# Patient Record
Sex: FEMALE | Race: WHITE | NOT HISPANIC OR LATINO | Employment: FULL TIME | URBAN - METROPOLITAN AREA
[De-identification: names, ages, dates, MRNs, and addresses within clinical notes are randomized per-mention and may not be internally consistent; named-entity substitution may affect disease eponyms.]

---
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
## 2022-05-07 ENCOUNTER — HEALTH MAINTENANCE LETTER (OUTPATIENT)
Age: 22
End: 2022-05-07

## 2023-01-07 ENCOUNTER — HEALTH MAINTENANCE LETTER (OUTPATIENT)
Age: 23
End: 2023-01-07

## 2023-06-02 ENCOUNTER — HEALTH MAINTENANCE LETTER (OUTPATIENT)
Age: 23
End: 2023-06-02

## 2024-09-02 ENCOUNTER — HOSPITAL ENCOUNTER (EMERGENCY)
Facility: CLINIC | Age: 24
Discharge: LEFT BEFORE DISCHARGE | End: 2024-09-02
Admitting: EMERGENCY MEDICINE
Payer: COMMERCIAL

## 2024-09-02 ENCOUNTER — HOSPITAL ENCOUNTER (EMERGENCY)
Facility: CLINIC | Age: 24
Discharge: HOME | End: 2024-09-03
Attending: EMERGENCY MEDICINE | Admitting: EMERGENCY MEDICINE
Payer: COMMERCIAL

## 2024-09-02 VITALS
DIASTOLIC BLOOD PRESSURE: 72 MMHG | RESPIRATION RATE: 14 BRPM | HEIGHT: 68 IN | SYSTOLIC BLOOD PRESSURE: 127 MMHG | HEART RATE: 65 BPM | OXYGEN SATURATION: 98 % | TEMPERATURE: 97.8 F

## 2024-09-02 DIAGNOSIS — R45.851 SUICIDAL IDEATION: ICD-10-CM

## 2024-09-02 DIAGNOSIS — R60.0 LOWER EXTREMITY EDEMA: ICD-10-CM

## 2024-09-02 DIAGNOSIS — F50.9 EATING DISORDER, UNSPECIFIED TYPE: ICD-10-CM

## 2024-09-02 PROCEDURE — 83735 ASSAY OF MAGNESIUM: CPT | Performed by: EMERGENCY MEDICINE

## 2024-09-02 PROCEDURE — 84443 ASSAY THYROID STIM HORMONE: CPT | Performed by: EMERGENCY MEDICINE

## 2024-09-02 PROCEDURE — 81001 URINALYSIS AUTO W/SCOPE: CPT | Performed by: EMERGENCY MEDICINE

## 2024-09-02 PROCEDURE — 99285 EMERGENCY DEPT VISIT HI MDM: CPT | Performed by: EMERGENCY MEDICINE

## 2024-09-02 PROCEDURE — 84703 CHORIONIC GONADOTROPIN ASSAY: CPT | Performed by: EMERGENCY MEDICINE

## 2024-09-02 PROCEDURE — 99281 EMR DPT VST MAYX REQ PHY/QHP: CPT

## 2024-09-02 PROCEDURE — 85004 AUTOMATED DIFF WBC COUNT: CPT | Performed by: EMERGENCY MEDICINE

## 2024-09-02 PROCEDURE — 36415 COLL VENOUS BLD VENIPUNCTURE: CPT | Performed by: EMERGENCY MEDICINE

## 2024-09-02 PROCEDURE — 82040 ASSAY OF SERUM ALBUMIN: CPT | Performed by: EMERGENCY MEDICINE

## 2024-09-02 PROCEDURE — 83880 ASSAY OF NATRIURETIC PEPTIDE: CPT | Performed by: EMERGENCY MEDICINE

## 2024-09-02 PROCEDURE — 82077 ASSAY SPEC XCP UR&BREATH IA: CPT | Performed by: EMERGENCY MEDICINE

## 2024-09-02 PROCEDURE — 80307 DRUG TEST PRSMV CHEM ANLYZR: CPT | Performed by: EMERGENCY MEDICINE

## 2024-09-02 PROCEDURE — 83690 ASSAY OF LIPASE: CPT | Performed by: EMERGENCY MEDICINE

## 2024-09-02 RX ORDER — PROPRANOLOL HYDROCHLORIDE 10 MG/1
10 TABLET ORAL 3 TIMES DAILY PRN
COMMUNITY

## 2024-09-02 RX ORDER — HYDROXYZINE HYDROCHLORIDE 50 MG/1
50 TABLET, FILM COATED ORAL 3 TIMES DAILY PRN
COMMUNITY

## 2024-09-02 ASSESSMENT — COLUMBIA-SUICIDE SEVERITY RATING SCALE - C-SSRS
1. IN THE PAST MONTH, HAVE YOU WISHED YOU WERE DEAD OR WISHED YOU COULD GO TO SLEEP AND NOT WAKE UP?: YES
2. HAVE YOU ACTUALLY HAD ANY THOUGHTS OF KILLING YOURSELF IN THE PAST MONTH?: YES
6. HAVE YOU EVER DONE ANYTHING, STARTED TO DO ANYTHING, OR PREPARED TO DO ANYTHING TO END YOUR LIFE?: NO
1. IN THE PAST MONTH, HAVE YOU WISHED YOU WERE DEAD OR WISHED YOU COULD GO TO SLEEP AND NOT WAKE UP?: NO
3. HAVE YOU BEEN THINKING ABOUT HOW YOU MIGHT KILL YOURSELF?: NO
4. HAVE YOU HAD THESE THOUGHTS AND HAD SOME INTENTION OF ACTING ON THEM?: NO
5. HAVE YOU STARTED TO WORK OUT OR WORKED OUT THE DETAILS OF HOW TO KILL YOURSELF? DO YOU INTEND TO CARRY OUT THIS PLAN?: NO
2. HAVE YOU ACTUALLY HAD ANY THOUGHTS OF KILLING YOURSELF IN THE PAST MONTH?: NO
6. HAVE YOU EVER DONE ANYTHING, STARTED TO DO ANYTHING, OR PREPARED TO DO ANYTHING TO END YOUR LIFE?: NO

## 2024-09-02 ASSESSMENT — ACTIVITIES OF DAILY LIVING (ADL): ADLS_ACUITY_SCORE: 33

## 2024-09-02 NOTE — LETTER
September 3, 2024      To Whom It May Concern:      Dulce Menjivar was in our Emergency Department today, 09/03/24.  I recommend she be excused from work on 9/4/24. She may return to work/school when improved.      Sincerely,        Dakota Keene, DO

## 2024-09-02 NOTE — LETTER
September 3, 2024      To Whom It May Concern:      Sparkle Menjivar was seen in our Emergency Department today, 09/03/24.  I expect her condition to improve over the next 2-3 days.  She may return to work/school when improved.    Sincerely,        Dakota Keene, DO

## 2024-09-03 ENCOUNTER — TELEPHONE (OUTPATIENT)
Dept: BEHAVIORAL HEALTH | Facility: CLINIC | Age: 24
End: 2024-09-03
Payer: COMMERCIAL

## 2024-09-03 VITALS
BODY MASS INDEX: 21.82 KG/M2 | WEIGHT: 144 LBS | RESPIRATION RATE: 16 BRPM | SYSTOLIC BLOOD PRESSURE: 104 MMHG | HEIGHT: 68 IN | OXYGEN SATURATION: 100 % | TEMPERATURE: 97.7 F | DIASTOLIC BLOOD PRESSURE: 70 MMHG | HEART RATE: 56 BPM

## 2024-09-03 PROBLEM — R45.851 SUICIDAL IDEATION: Status: ACTIVE | Noted: 2024-09-03

## 2024-09-03 PROBLEM — F29 PSYCHOSIS (H): Status: ACTIVE | Noted: 2024-09-03

## 2024-09-03 LAB
ALBUMIN SERPL BCG-MCNC: 4.4 G/DL (ref 3.5–5.2)
ALBUMIN UR-MCNC: NEGATIVE MG/DL
ALP SERPL-CCNC: 47 U/L (ref 40–150)
ALT SERPL W P-5'-P-CCNC: 7 U/L (ref 0–50)
AMPHETAMINES UR QL SCN: NORMAL
ANION GAP SERPL CALCULATED.3IONS-SCNC: 11 MMOL/L (ref 7–15)
APPEARANCE UR: CLEAR
AST SERPL W P-5'-P-CCNC: 18 U/L (ref 0–45)
BARBITURATES UR QL SCN: NORMAL
BASOPHILS # BLD AUTO: 0.1 10E3/UL (ref 0–0.2)
BASOPHILS NFR BLD AUTO: 1 %
BENZODIAZ UR QL SCN: NORMAL
BILIRUB SERPL-MCNC: 0.4 MG/DL
BILIRUB UR QL STRIP: NEGATIVE
BUN SERPL-MCNC: 7.2 MG/DL (ref 6–20)
BZE UR QL SCN: NORMAL
CALCIUM SERPL-MCNC: 9 MG/DL (ref 8.8–10.4)
CANNABINOIDS UR QL SCN: NORMAL
CHLORIDE SERPL-SCNC: 104 MMOL/L (ref 98–107)
COLOR UR AUTO: ABNORMAL
CREAT SERPL-MCNC: 0.71 MG/DL (ref 0.51–0.95)
EGFRCR SERPLBLD CKD-EPI 2021: >90 ML/MIN/1.73M2
EOSINOPHIL # BLD AUTO: 0.1 10E3/UL (ref 0–0.7)
EOSINOPHIL NFR BLD AUTO: 2 %
ERYTHROCYTE [DISTWIDTH] IN BLOOD BY AUTOMATED COUNT: 12.3 % (ref 10–15)
ETHANOL SERPL-MCNC: <0.01 G/DL
FENTANYL UR QL: NORMAL
GLUCOSE SERPL-MCNC: 115 MG/DL (ref 70–99)
GLUCOSE UR STRIP-MCNC: NEGATIVE MG/DL
HCG SERPL QL: NEGATIVE
HCO3 SERPL-SCNC: 26 MMOL/L (ref 22–29)
HCT VFR BLD AUTO: 34.8 % (ref 35–47)
HGB BLD-MCNC: 11.9 G/DL (ref 11.7–15.7)
HGB UR QL STRIP: NEGATIVE
IMM GRANULOCYTES # BLD: 0 10E3/UL
IMM GRANULOCYTES NFR BLD: 0 %
KETONES UR STRIP-MCNC: NEGATIVE MG/DL
LEUKOCYTE ESTERASE UR QL STRIP: ABNORMAL
LIPASE SERPL-CCNC: 31 U/L (ref 13–60)
LYMPHOCYTES # BLD AUTO: 2.9 10E3/UL (ref 0.8–5.3)
LYMPHOCYTES NFR BLD AUTO: 38 %
MAGNESIUM SERPL-MCNC: 2.1 MG/DL (ref 1.7–2.3)
MCH RBC QN AUTO: 31.6 PG (ref 26.5–33)
MCHC RBC AUTO-ENTMCNC: 34.2 G/DL (ref 31.5–36.5)
MCV RBC AUTO: 92 FL (ref 78–100)
MONOCYTES # BLD AUTO: 0.3 10E3/UL (ref 0–1.3)
MONOCYTES NFR BLD AUTO: 4 %
NEUTROPHILS # BLD AUTO: 4.3 10E3/UL (ref 1.6–8.3)
NEUTROPHILS NFR BLD AUTO: 55 %
NITRATE UR QL: NEGATIVE
NRBC # BLD AUTO: 0 10E3/UL
NRBC BLD AUTO-RTO: 0 /100
NT-PROBNP SERPL-MCNC: 131 PG/ML (ref 0–450)
OPIATES UR QL SCN: NORMAL
PCP QUAL URINE (ROCHE): NORMAL
PH UR STRIP: 6.5 [PH] (ref 5–7)
PLATELET # BLD AUTO: 172 10E3/UL (ref 150–450)
POTASSIUM SERPL-SCNC: 3.2 MMOL/L (ref 3.4–5.3)
PROT SERPL-MCNC: 7.2 G/DL (ref 6.4–8.3)
RBC # BLD AUTO: 3.77 10E6/UL (ref 3.8–5.2)
RBC URINE: 1 /HPF
SODIUM SERPL-SCNC: 141 MMOL/L (ref 135–145)
SP GR UR STRIP: 1.01 (ref 1–1.03)
TSH SERPL DL<=0.005 MIU/L-ACNC: 2.44 UIU/ML (ref 0.3–4.2)
UROBILINOGEN UR STRIP-MCNC: NORMAL MG/DL
WBC # BLD AUTO: 7.8 10E3/UL (ref 4–11)
WBC URINE: 1 /HPF

## 2024-09-03 PROCEDURE — 250N000013 HC RX MED GY IP 250 OP 250 PS 637: Performed by: EMERGENCY MEDICINE

## 2024-09-03 RX ORDER — POTASSIUM CHLORIDE 20MEQ/15ML
40 LIQUID (ML) ORAL ONCE
Status: COMPLETED | OUTPATIENT
Start: 2024-09-03 | End: 2024-09-03

## 2024-09-03 RX ORDER — HYDROXYZINE HYDROCHLORIDE 25 MG/1
25 TABLET, FILM COATED ORAL EVERY 8 HOURS PRN
Status: DISCONTINUED | OUTPATIENT
Start: 2024-09-03 | End: 2024-09-03 | Stop reason: HOSPADM

## 2024-09-03 RX ADMIN — POTASSIUM CHLORIDE 40 MEQ: 40 SOLUTION ORAL at 00:55

## 2024-09-03 ASSESSMENT — ACTIVITIES OF DAILY LIVING (ADL)
ADLS_ACUITY_SCORE: 35

## 2024-09-03 NOTE — ED PROVIDER NOTES
Patient received in signout from Dr. Bains.  See his note for further documentation.  Patient with history of autism, presents to the ED for evaluation of auditory hallucinations.  Plan at signout is follow-up with mental health assessment recommendations and disposition accordingly.    Patient was seen by the .  See their note for supporting documentation.  Patient does continue to express auditory hallucinations but denies any active suicidal and has no intent to act on the hallucinations.  Does not meet inpatient criteria.  Plan for close outpatient psychiatry follow-up.  Can consider either level of care such as an IRTS facility.  Will follow-up in the transition clinic to be scheduled by the assessment team.    I discussed the plan and recommendations extensively with the patient and her sister.  They are eager to go home and feel comfortable with this plan.  The patient will stay with her sister who will monitor and help manage her symptoms and agrees to return to the ED with any new or worsening symptoms.     Dakota Keene,   09/03/24 0344

## 2024-09-03 NOTE — ED TRIAGE NOTES
Hallucinations Patient presents due to command hallucinations telling patient to kill herself.        Suicidal Patient reports suicidal thoughts; denies plan at this time.         Triage Assessment (Adult)       Row Name 09/02/24 8266          Triage Assessment    Airway WDL WDL        Respiratory WDL    Respiratory WDL WDL        Skin Circulation/Temperature WDL    Skin Circulation/Temperature WDL WDL        Cardiac WDL    Cardiac WDL WDL        Peripheral/Neurovascular WDL    Peripheral Neurovascular WDL WDL        Cognitive/Neuro/Behavioral WDL    Cognitive/Neuro/Behavioral WDL WDL

## 2024-09-03 NOTE — TELEPHONE ENCOUNTER
Reached out to patient. Explained referral and inquired why patient requesting Diagnostic Assessment. Recently more MH going on and needs reassessment to determine if other psychosis at play. Said had assessment done years ago.     Scheduled DA 9/12/2024.    Radha Davis  Transition Clinic Coordinator  09/03/24 9:42 AM

## 2024-09-03 NOTE — DISCHARGE INSTRUCTIONS
Please make an appointment to follow up with your therapist and/or Psychiatric Clinic (phone: [phone])    Return to the ED if you are having worsening thoughts/feelings of harming yourself or others, or any urgent/life-threatening concerns.     DISCHARGE RESOURCES:  -[facility] [phone]   -[facility] [phone] or [phone].  -Crisis Intervention: [phone] or [phone] (TTY: [phone]).  Call anytime.  -Suicide Awareness Voices of Education (SAVE) (www.save.org): 104-083-HRJC (5333)  -National Suicide Prevention Line (www.mentalhealthmn.org): 132-099-XGJC (0589)  -National [place] on Mental Illness (www.[place].zabrina.org): 745.585.7751 or 292-047-8533.  -Tria9wtyc: text the word LIFE to 45383 for immediate support and crisis intervention  -Mental Health Consumer/Survivor Network of [place] (www.mhcsn.net): 680.644.2767 or 423-318-1388  -Mental Health Association [place] (www.mentalhealth.org): 487.374.6116 or 304-169-8211

## 2024-09-03 NOTE — TELEPHONE ENCOUNTER
Teams referral: Frankie Reeder 3:22 AM  Hello, pt [id number] will need help scheduling a diagnostic assessment in the [facility] area. Ph: [phone], email:  [email or web address]  . No other preferences stated. My apologies if I need to ask another dept for a DA...feel free to message me directly if I need to refer elsewhere. Thanks!    Radha Davis  Transition Clinic Coordinator  09/03/24 7:41 AM
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
## 2024-09-11 ASSESSMENT — ANXIETY QUESTIONNAIRES
7. FEELING AFRAID AS IF SOMETHING AWFUL MIGHT HAPPEN: NEARLY EVERY DAY
GAD7 TOTAL SCORE: 21
GAD7 TOTAL SCORE: 21
8. IF YOU CHECKED OFF ANY PROBLEMS, HOW DIFFICULT HAVE THESE MADE IT FOR YOU TO DO YOUR WORK, TAKE CARE OF THINGS AT HOME, OR GET ALONG WITH OTHER PEOPLE?: EXTREMELY DIFFICULT
GAD7 TOTAL SCORE: 21

## 2024-09-11 ASSESSMENT — PATIENT HEALTH QUESTIONNAIRE - PHQ9
SUM OF ALL RESPONSES TO PHQ QUESTIONS 1-9: 27
10. IF YOU CHECKED OFF ANY PROBLEMS, HOW DIFFICULT HAVE THESE PROBLEMS MADE IT FOR YOU TO DO YOUR WORK, TAKE CARE OF THINGS AT HOME, OR GET ALONG WITH OTHER PEOPLE: EXTREMELY DIFFICULT
SUM OF ALL RESPONSES TO PHQ QUESTIONS 1-9: 27

## 2024-09-12 ENCOUNTER — VIRTUAL VISIT (OUTPATIENT)
Dept: BEHAVIORAL HEALTH | Facility: CLINIC | Age: 24
End: 2024-09-12
Payer: COMMERCIAL

## 2024-09-12 ENCOUNTER — TELEPHONE (OUTPATIENT)
Dept: BEHAVIORAL HEALTH | Facility: CLINIC | Age: 24
End: 2024-09-12
Payer: COMMERCIAL

## 2024-09-12 DIAGNOSIS — F41.9 ANXIETY DISORDER, UNSPECIFIED: ICD-10-CM

## 2024-09-12 DIAGNOSIS — F34.1 PERSISTENT DEPRESSIVE DISORDER WITH ANXIOUS DISTRESS, CURRENTLY MODERATE: Primary | ICD-10-CM

## 2024-09-12 DIAGNOSIS — F41.9 ANXIETY DISORDER, UNSPECIFIED TYPE: Primary | ICD-10-CM

## 2024-09-12 PROCEDURE — 99207 PR NO CHARGE LOS: CPT | Mod: 95 | Performed by: SOCIAL WORKER

## 2024-09-12 NOTE — PROGRESS NOTES
Answers submitted by the patient for this visit:  Patient Health Questionnaire (Submitted on 2024)  If you checked off any problems, how difficult have these problems made it for you to do your work, take care of things at home, or get along with other people?: Extremely difficult  PHQ9 TOTAL SCORE: 27  Patient Health Questionnaire (G7) (Submitted on 2024)  EDGAR 7 TOTAL SCORE: 21      Transition [facility] - Initial Visit Progress Note    Patient  Name: Sparkle Menjivar, : 2000    Date:  2024       Service Type:  Mental Health Initial Visit       VISIT TIME START: 830  VISIT TIME END: 922     Session Length:   TC Session Length: 45 (38-52 Minutes)    Visit #: 1    Attendees:  Patient and her sister Dulce     Service Modality:  Service Modality: Video Visit:      Provider verified identity through the following two step process.  Patient provided:  Patient     Telemedicine Visit: The patient's condition can be safely assessed and treated via synchronous audio and visual telemedicine encounter.      Reason for Telemedicine Visit:  Patient was referred from ED for diagnostic of assessment and recommendations.    Originating Site (Patient Location): Patient's home    Distant Site (Provider Location): Provider Remote Setting- Home Office    Consent:  The patient/guardian has verbally consented to: the potential risks and benefits of telemedicine (video visit) versus in person care; bill my insurance or make self-payment for services provided; and responsibility for payment of non-covered services.     Patient would like the video invitation sent by:  Send to e-mail at: [email or web address]    Mode of Communication:  Video Conference via [facility]    Distant Location (Provider):  Off-site    As the provider I attest to compliance with applicable laws and regulations related to telemedicine.    Source of Referral:  Other      Informed Consent and Assessment Methods    This provider and patient  discussed HIPAA, and the limits of confidentiality; including mandated reporting, the possibility of collaborative discussions with patient's primary care provider and the multidisciplinary team in the MH clinic during consultation.  Discussed the no show policy, and the benefits and possible unintended consequences of therapy.  Patient indicated understanding Transition Clinic services are short term, solution focused, until a referral can be made and patient can bridge to long term therapy.  Patient verbally indicated understanding the informed consent.         Presenting Concerns/  Current Stressors:  Sparkle Menjivar is a 24 year old identified female  who was referred to the Transition Clinic  for evaluation and recommendation after being seen in  [facility] ED on 9/3/2024  for  suicidal ideation.  Sparkle Menjivar reports   That she currently is seeing a   outpatient Therapist through [facility] 1x  per week   and twice a week in the outpatient [facility]. \  Per medical record and info from pt. and sister, patient has undergone Psychological testing in 2019 in [place]  which they indicated a  Dx. of Autism Spectrum Disorder, Eating Disorder and Depression.  Other   previous hospitalizations  include one week at [facility] in April 2024 for suicidality.  Current symptoms include:  depressed mood,  nonthreatening auditory hallucinations,  eating concerns, intermittent resistiveness  structure and routine.    It was  recommended on the 9-3-2024 DA  from the ED  that IRTS  services would best suit patient's needs as well as, psychological testing.     Patient's sister had referenced her concern that schizophrenia may be a diagnosis her sister is experiencing based  on patient's history of auditory and visual hallucinations.  This would need further evaluation/testing.   Per patient authorization was given for writer to speak  with [facility] in [place].([place]) [phone]   stated that the standard protocol is for the [facility]  to put a referral and and then [facility] can do the diagnostic assessment.  This information was relayed back to patient's sister per patient authorization.      ASSESSMENT:    Required Screenings: The following assessments were completed by patient for this visit:  PHQ9:       9/11/2024     7:15 PM   PHQ-9 SCORE   PHQ-9 Total Score MyChart 27 (Severe depression)   PHQ-9 Total Score 27     GAD7:       9/11/2024     7:15 PM   EDGAR-7 SCORE   Total Score 21 (severe anxiety)   Total Score 21     CAGE-AID:       9/11/2024     7:17 PM   CAGE-AID Total Score   Total Score 0   Total Score MyChart 0 (A total score of 2 or greater is considered clinically significant)     PROMIS 10-Global Health (all questions and answers displayed):       9/11/2024     7:17 PM   PROMIS 10   In general, would you say your health is: Good   In general, would you say your quality of life is: Fair   In general, how would you rate your physical health? Good   In general, how would you rate your mental health, including your mood and your ability to think? Poor   In general, how would you rate your satisfaction with your social activities and relationships? Poor   In general, please rate how well you carry out your usual social activities and roles Fair   To what extent are you able to carry out your everyday physical activities such as walking, climbing stairs, carrying groceries, or moving a chair? Mostly   In the past 7 days, how often have you been bothered by emotional problems such as feeling anxious, depressed, or irritable? Always   In the past 7 days, how would you rate your fatigue on average? Severe   In the past 7 days, how would you rate your pain on average, where 0 means no pain, and 10 means worst imaginable pain? 1   In general, would you say your health is: 3   In general, would you say your quality of life is: 2   In general, how would you rate your  physical health? 3   In general, how would you rate your mental health, including your mood and your ability to think? 1   In general, how would you rate your satisfaction with your social activities and relationships? 1   In general, please rate how well you carry out your usual social activities and roles. (This includes activities at home, at work and in your community, and responsibilities as a parent, child, spouse, employee, friend, etc.) 2   To what extent are you able to carry out your everyday physical activities such as walking, climbing stairs, carrying groceries, or moving a chair? 4   In the past 7 days, how often have you been bothered by emotional problems such as feeling anxious, depressed, or irritable? 5   In the past 7 days, how would you rate your fatigue on average? 4   In the past 7 days, how would you rate your pain on average, where 0 means no pain, and 10 means worst imaginable pain? 1   Global Mental Health Score 5   Global Physical Health Score 13   PROMIS TOTAL - SUBSCORES 18     [place] Suicide Severity Rating Scale (Lifetime/Recent)      9/2/2024     7:58 PM 9/2/2024    10:49 PM 9/3/2024     4:04 AM 9/3/2024     4:06 AM   [place] Suicide Severity Rating (Lifetime/Recent)   Q1 Wish to be Dead (Lifetime)    Yes   Q2 Non-Specific Active Suicidal Thoughts (Lifetime)    Yes   Q1 Wished to be Dead (Past Month) 0-->no 1-->yes 1-->yes    Q2 Suicidal Thoughts (Past Month) 0-->no 1-->yes 1-->yes    Q3 Suicidal Thought Method  0-->no 0-->no    Q4 Suicidal Intent without Specific Plan  0-->no 0-->no    Q5 Suicide Intent with Specific Plan  0-->no 0-->no    Q6 Suicide Behavior (Lifetime) 0-->no 0-->no  0-->no   Level of Risk per Screen no risks indicated low risk low risk    3. Active Suicidal Ideation with any Methods (Not Plan) Without Intent to Act (Lifetime)    N   Q4 Active Suicidal Ideation with Some Intent to Act, Without Specific Plan (Lifetime)    N   Q5 Active Suicidal Ideation with  Specific Plan and Intent (Lifetime)    N   Description of Most Severe Ideation (Past 1 Month)   Voice command to kill/harm self    Controllability (Past 1 Month)   5    Deterrents (Past 1 Month)   1    Reasons for Ideation (Past 1 Month)   0    Actual Attempt (Lifetime)    N   Actual Attempt (Past 3 Months)   N    Has subject engaged in non-suicidal self-injurious behavior? (Lifetime)    Y   Has subject engaged in non-suicidal self-injurious behavior? (Past 3 Months)   Y    Interrupted Attempts (Lifetime)    N   Interrupted Attempts (Past 3 Months)   N    Aborted or Self-Interrupted Attempt (Lifetime)    N   Aborted or Self-Interrupted Attempt (Past 3 Months)   N    Preparatory Acts or Behavior (Lifetime)    N   Preparatory Acts or Behavior (Past 3 Months)   N    Calculated C-SSRS Risk Score (Lifetime/Recent)   No Risk Indicated No Risk Indicated       Mental Status Assessment:  Appearance:   Appropriate   Eye Contact:   Fair   Psychomotor Behavior: Normal   Attitude:   Guarded   Orientation:   All  Speech     Rate / Production:  Normal/ Responsive     Volume:   Normal   Mood:    Apathetic  Affect:    Flat   Thought Content:  Hallucinations    Patient indicated that she has auditory hallucinations one-person although reports today  the voice is not telling her to harm herself or others.  Thought Form:  Coherent   Insight:    Fair       Safety Issues and Plan for Safety and Risk Management:     Patient denies current fears or concerns for personal safety.  Patient reports the following current or recent suicidal ideation or behaviors:   Passive thoughts no intentions to harm self or others. safety plan was completed in chart.   Patient denies current or recent homicidal ideation or behaviors.  Patient reports current or recent self injurious behavior or ideation including  has had a history of self-harm no indication today or plans..  Patient denies other safety concerns.  Recommended that patient call 911 or go to  the local ED should there be a change in any of these risk factors.  Patient reports there are no firearms in the house.     Diagnostic Criteria:  Major Depressive Disorder   - Depressed mood. Note: In children and adolescents, can be irritable mood.     - Diminished interest or pleasure in all, or almost all, activities.    - Significant weight loss when not dieting decrease in appetite.    - Decreased sleep.    - Fatigue or loss of energy.    - Diminished ability to think or concentrate, or indecisiveness.   Unspecified Anxiety Disorder , Symptoms characteristic of an anxiety disorder that caused clinically significant distress or impairment in social, occupational, or other important areas of functioning predominate but do not meet the full criteria for any of the disorders of the anxiety disorders diagnostic class.    DSM5 Diagnoses: (Sustained by DSM5 Criteria Listed Above)  Diagnoses: 300.4 (F34.1) Persistent Depressive Disorder, With persistent major depressive episode  300.00 (F41.9) Unspecified Anxiety Disorder  307.5 (F50.2) Bulimia Nervosa   In partial remission  Severity: Moderate  Psychosocial & Contextual Factors: Residing with sister, few friends and social support.  Current Outpatient Medications   Medication Sig Dispense Refill    cetirizine (ZYRTEC) 10 MG tablet Take 10 mg by mouth as needed for allergies      hydrOXYzine HCl (ATARAX) 50 MG tablet Take 50 mg by mouth 3 times daily as needed for itching.      Multiple Vitamins-Minerals (MULTIVITAMIN ADULT PO)       propranolol (INDERAL) 10 MG tablet Take 10 mg by mouth 3 times daily as needed.       No current facility-administered medications for this visit.     Patient indicated that she is taking her medications     Intervention:   Solution Focused Brief Therapy: stop thought processing techniques and strategies.   Cognitive Behavioral Therapy (CBT) - Discussed changing thoughts is the path to changing behaviors and feelings. and Solution-Focused  Brief Therapy (SFBT) - Change is perpetual, focus on the problematic excerptions. Reality is subjective and social constructed through conversation.    Collateral Reports Completed:  TC Collateral[facility]. and Verbally communicated with DEC  (writer obtained verbal authorization from patient.)     DATA:  Interactive Complexity: No  Crisis: No    PLAN: (Homework, other):  Provider will continue Diagnostic Assessment. Initial Treatment will focus on: Depressed Mood -    Anxiety -   .  2.   Provider recommended the following referrals:   To [facility]  in regard to submitting a referral for a IRTS.  3.   Information in this assessment was obtained from the medical record and provided by patient and family who is a good historian.   4.   Follow up scheduled:  None indicated.        Patient was given the following to do until next session:    Contact [facility] to begin IRTS referral.   [facility] in [place]. ([place]) [phone] (She can complete a DA)  5.  Anticipated Discharge: 9/12/24  6. Is this the patient's last discharge: Yes. Reason for Discharge Other: referring to [facility]) to begin referral process for IRTS  Level of care: Other:        Procedure Code:  Psychotherapy (with patient) - 45 [CPT 08624]    MARIANA Reddy  9/12/24    Suicide Risk and Safety Concerns were assessed for. Patient meets the following risk assessment and triage: Patient has no change in safety concerns. Committed to safety and agreed to follow previously developed safety plan.

## 2024-09-12 NOTE — TELEPHONE ENCOUNTER
Summary of Patient Care Communication Handoff to Patient Navigator Coordinator    PATIENT'S NAME: Sparkle Menjivar  MRN:   [id number]  :   2000    DATE OF SERVICE: 24    Referral Needed: Yes    Is the patient coming from an inpatient unit? No    What program is this referral for? Adult Mental Health Referral    Psychiatry for diagnostic clarification.  Level of Care Recommended:  Community Referral for: IRTS       Mental Health Referral Needed: Yes:  psychiatry to clarify diagnosis    Release of Information Needed:      Faxing Needed:  Follow up Requests:      Comments:  there is already a diagnostic assessment and there from her ED visit.  family was wanting a DX IRTS.  Per the IRTS facility the [facility]  would give they referral and the facility would do the diagnostic assessment  to see if they meet the criteria.     MARIANA Reddy  24          Patient Navigator Coordinator Contact Information  Pool Message: [email or web address] ([id number])   Phone:  [phone]  Fax:  [phone]  Email:  [email or web address]

## 2024-09-13 NOTE — TELEPHONE ENCOUNTER
**Patient Navigator Follow Up**    9/13/2024;    What program is this referral for? Adult Mental Health Referral    Psychiatry for diagnostic clarification.  Level of Care Recommended:  Community Referral for: IRTS     Mental Health Referral Needed: Yes:  psychiatry to clarify diagnosis        Comments:  there is already a diagnostic assessment and there from her ED visit.  family was wanting a DX IRTS.  Per the IRTS facility the county  would give they referral and the facility would do the diagnostic assessment  to see if they meet the criteria.           **Referral form completed and sent to [facility]) and she will follow up accordingly with patient in regards to requested community referral for IRTS.      **9035 order was placed for Psychiatry Referral.  The [facility] will follow up with patient directly to schedule this requested service.        Thank you,    Alondra OQUENDO  Patient Navigator

## 2024-09-27 ENCOUNTER — TELEPHONE (OUTPATIENT)
Dept: BEHAVIORAL HEALTH | Facility: CLINIC | Age: 24
End: 2024-09-27
Payer: COMMERCIAL

## 2024-09-27 NOTE — TELEPHONE ENCOUNTER
[facility]       Referral Date: 9/13/2024 (2nd Attempt)    Reason for Referral:  IRTS referral Follow up    Referral Status: (urgent or general)  General    Method of Communication:   Phone call    Plan or goal of contact/ previous contact information:   SW left VM with contact information and requested a return call.     Follow up required:   No.     Work done on case:   N/a    Important Information and next steps:   N/a

## 2024-10-10 RX ORDER — RISPERIDONE 0.25 MG/1
0.5 TABLET, ORALLY DISINTEGRATING ORAL 2 TIMES DAILY
COMMUNITY
End: 2024-10-11

## 2024-10-10 ASSESSMENT — COLUMBIA-SUICIDE SEVERITY RATING SCALE - C-SSRS
1. IN THE PAST MONTH, HAVE YOU WISHED YOU WERE DEAD OR WISHED YOU COULD GO TO SLEEP AND NOT WAKE UP?: YES
5. HAVE YOU STARTED TO WORK OUT OR WORKED OUT THE DETAILS OF HOW TO KILL YOURSELF? DO YOU INTEND TO CARRY OUT THIS PLAN?: NO
6. HAVE YOU EVER DONE ANYTHING, STARTED TO DO ANYTHING, OR PREPARED TO DO ANYTHING TO END YOUR LIFE?: YES
4. HAVE YOU HAD THESE THOUGHTS AND HAD SOME INTENTION OF ACTING ON THEM?: YES
3. HAVE YOU BEEN THINKING ABOUT HOW YOU MIGHT KILL YOURSELF?: YES
2. HAVE YOU ACTUALLY HAD ANY THOUGHTS OF KILLING YOURSELF IN THE PAST MONTH?: YES

## 2024-10-11 ENCOUNTER — HOSPITAL ENCOUNTER (OUTPATIENT)
Facility: CLINIC | Age: 24
Setting detail: OBSERVATION
Discharge: PSYCHIATRIC OR SUBSTANCE USE FACILITY | End: 2024-10-12
Attending: EMERGENCY MEDICINE | Admitting: EMERGENCY MEDICINE
Payer: COMMERCIAL

## 2024-10-11 ENCOUNTER — TELEPHONE (OUTPATIENT)
Dept: BEHAVIORAL HEALTH | Facility: CLINIC | Age: 24
End: 2024-10-11

## 2024-10-11 DIAGNOSIS — R44.3 HALLUCINATIONS: ICD-10-CM

## 2024-10-11 DIAGNOSIS — R45.851 SUICIDAL IDEATION: ICD-10-CM

## 2024-10-11 DIAGNOSIS — F33.3 SEVERE EPISODE OF RECURRENT MAJOR DEPRESSIVE DISORDER, WITH PSYCHOTIC FEATURES (H): ICD-10-CM

## 2024-10-11 LAB
AMPHETAMINES UR QL SCN: NORMAL
BARBITURATES UR QL SCN: NORMAL
BENZODIAZ UR QL SCN: NORMAL
BZE UR QL SCN: NORMAL
CANNABINOIDS UR QL SCN: NORMAL
FENTANYL UR QL: NORMAL
HCG UR QL: NEGATIVE
OPIATES UR QL SCN: NORMAL
PCP QUAL URINE (ROCHE): NORMAL
SARS-COV-2 RNA RESP QL NAA+PROBE: NEGATIVE

## 2024-10-11 PROCEDURE — 250N000013 HC RX MED GY IP 250 OP 250 PS 637: Performed by: EMERGENCY MEDICINE

## 2024-10-11 PROCEDURE — G0378 HOSPITAL OBSERVATION PER HR: HCPCS

## 2024-10-11 PROCEDURE — 250N000013 HC RX MED GY IP 250 OP 250 PS 637: Performed by: NURSE PRACTITIONER

## 2024-10-11 PROCEDURE — 99223 1ST HOSP IP/OBS HIGH 75: CPT | Mod: 95 | Performed by: NURSE PRACTITIONER

## 2024-10-11 PROCEDURE — 80307 DRUG TEST PRSMV CHEM ANLYZR: CPT | Performed by: EMERGENCY MEDICINE

## 2024-10-11 PROCEDURE — 81025 URINE PREGNANCY TEST: CPT | Performed by: EMERGENCY MEDICINE

## 2024-10-11 PROCEDURE — 99285 EMERGENCY DEPT VISIT HI MDM: CPT | Mod: 25

## 2024-10-11 PROCEDURE — 87635 SARS-COV-2 COVID-19 AMP PRB: CPT | Performed by: NURSE PRACTITIONER

## 2024-10-11 RX ORDER — ARIPIPRAZOLE 5 MG/1
5 TABLET ORAL 2 TIMES DAILY PRN
Status: DISCONTINUED | OUTPATIENT
Start: 2024-10-11 | End: 2024-10-12 | Stop reason: HOSPADM

## 2024-10-11 RX ORDER — RISPERIDONE 0.5 MG/1
0.25 TABLET ORAL AT BEDTIME
Status: ON HOLD | COMMUNITY
End: 2024-10-29

## 2024-10-11 RX ORDER — RISPERIDONE 0.25 MG/1
0.25 TABLET ORAL AT BEDTIME
Status: DISCONTINUED | OUTPATIENT
Start: 2024-10-11 | End: 2024-10-11

## 2024-10-11 RX ORDER — OLANZAPINE 5 MG/1
5 TABLET ORAL 4 TIMES DAILY PRN
Status: DISCONTINUED | OUTPATIENT
Start: 2024-10-11 | End: 2024-10-12 | Stop reason: HOSPADM

## 2024-10-11 RX ORDER — RISPERIDONE 0.5 MG/1
0.5 TABLET ORAL 2 TIMES DAILY
Status: DISCONTINUED | OUTPATIENT
Start: 2024-10-11 | End: 2024-10-11

## 2024-10-11 RX ORDER — HYDROXYZINE HYDROCHLORIDE 25 MG/1
25 TABLET, FILM COATED ORAL EVERY 4 HOURS PRN
Status: DISCONTINUED | OUTPATIENT
Start: 2024-10-11 | End: 2024-10-12 | Stop reason: HOSPADM

## 2024-10-11 RX ORDER — RISPERIDONE 0.25 MG/1
0.25 TABLET ORAL DAILY
Status: DISCONTINUED | OUTPATIENT
Start: 2024-10-11 | End: 2024-10-11

## 2024-10-11 RX ORDER — PROPRANOLOL HYDROCHLORIDE 10 MG/1
10 TABLET ORAL 3 TIMES DAILY PRN
Status: DISCONTINUED | OUTPATIENT
Start: 2024-10-11 | End: 2024-10-12 | Stop reason: HOSPADM

## 2024-10-11 RX ORDER — ACETAMINOPHEN 325 MG/1
650 TABLET ORAL EVERY 4 HOURS PRN
Status: DISCONTINUED | OUTPATIENT
Start: 2024-10-11 | End: 2024-10-12 | Stop reason: HOSPADM

## 2024-10-11 RX ORDER — TRAZODONE HYDROCHLORIDE 50 MG/1
50 TABLET, FILM COATED ORAL
Status: DISCONTINUED | OUTPATIENT
Start: 2024-10-11 | End: 2024-10-12 | Stop reason: HOSPADM

## 2024-10-11 RX ORDER — POLYETHYLENE GLYCOL 3350 17 G/17G
1 POWDER, FOR SOLUTION ORAL
Status: ON HOLD | COMMUNITY
End: 2024-10-29

## 2024-10-11 RX ORDER — OLANZAPINE 10 MG/2ML
10 INJECTION, POWDER, FOR SOLUTION INTRAMUSCULAR 2 TIMES DAILY PRN
Status: DISCONTINUED | OUTPATIENT
Start: 2024-10-11 | End: 2024-10-12 | Stop reason: HOSPADM

## 2024-10-11 RX ORDER — HYDROXYZINE HYDROCHLORIDE 10 MG/1
10-20 TABLET, FILM COATED ORAL EVERY 8 HOURS PRN
Status: ON HOLD | COMMUNITY
End: 2024-10-29

## 2024-10-11 RX ORDER — PROPRANOLOL HYDROCHLORIDE 10 MG/1
10 TABLET ORAL 3 TIMES DAILY
Status: DISCONTINUED | OUTPATIENT
Start: 2024-10-11 | End: 2024-10-11

## 2024-10-11 RX ORDER — IBUPROFEN 600 MG/1
600 TABLET, FILM COATED ORAL EVERY 6 HOURS PRN
Status: DISCONTINUED | OUTPATIENT
Start: 2024-10-11 | End: 2024-10-12 | Stop reason: HOSPADM

## 2024-10-11 RX ORDER — ARIPIPRAZOLE 5 MG/1
10 TABLET ORAL AT BEDTIME
Status: DISCONTINUED | OUTPATIENT
Start: 2024-10-11 | End: 2024-10-12 | Stop reason: HOSPADM

## 2024-10-11 RX ADMIN — ARIPIPRAZOLE 10 MG: 5 TABLET ORAL at 21:19

## 2024-10-11 RX ADMIN — PROPRANOLOL HYDROCHLORIDE 10 MG: 10 TABLET ORAL at 09:16

## 2024-10-11 NOTE — ED NOTES
Pt would prefer loose fitting scrubs - scrubs provided at this time. Pt cooperative in putting other belongings in bag and putting in locker.

## 2024-10-11 NOTE — ED TRIAGE NOTES
States has suicidal thoughts but no plan at this time. States has had thoughts in past. In processof getting  IRTZ placement. Has a burn on wrist from work and does hit herself and was going to hit herself in the parking lot per sister's report     Triage Assessment (Adult)       Row Name 10/10/24 8219          Triage Assessment    Airway WDL WDL        Respiratory WDL    Respiratory WDL WDL        Skin Circulation/Temperature WDL    Skin Circulation/Temperature WDL WDL        Cardiac WDL    Cardiac WDL WDL        Peripheral/Neurovascular WDL    Peripheral Neurovascular WDL WDL        Cognitive/Neuro/Behavioral WDL    Cognitive/Neuro/Behavioral WDL WDL

## 2024-10-11 NOTE — ED NOTES
IP MH Referral Acuity Rating Score (RARS)    LMHP complete at referral to IP MH, with DEC; and, daily while awaiting IP MH placement. Call score to PPS.  CRITERIA SCORING   New 74 HH and Involuntary for IP MH (not adolescent) 0/1   Boarding over 24 hours 0/1   Vulnerable adult at least 55+ with multiple co morbidities; or, Patient age 11 or under 0/1   Suicide ideation without relief of precipitating factors 1/1   Current plan for suicide 1/1   Current plan for homicide 0/1   Imminent risk or actual attempt to seriously harm another without relief of factors precipitating the attempt 1/1   Severe dysfunction in daily living (ex: complete neglect for self care, extreme disruption in vegetative function, extreme deterioration in social interactions) 1/1   Recent (last 2 weeks) or current physical aggression in the ED 0/1   Restraints or seclusion episode in ED 0/1   Verbal aggression, agitation, yelling, etc., while in the ED 0/1   Active psychosis with psychomotor agitation or catatonia 1/1   Need for constant or near constant redirection (from leaving, from others, etc).  0/1   Intrusive or disruptive behaviors 0/1   TOTAL Acuity Total Score: 5

## 2024-10-11 NOTE — ED PROVIDER NOTES
Sign Out Note    Pt accepted in sign out from: Dr. Ruiz    Briefly pt presented to the ED for: Hallucinations, suicidal ideation    Plan at time of sign out: Await DEC evaluation    Care of patient during my shift: Zachery with the DEC was able to evaluate the patientand felt the patient to be appropriate for empath.  Patient cooperative and wants to go over there.  No indication for 72-hour hold at this time.    Plan for patient at this time: Transfer to [facility],   10/11/24 1145
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
## 2024-10-11 NOTE — ED NOTES
Patient brought over from ED 24. Patient has a quiet voice but answers questions. She has visual and auditory hallucinations for the last 2 years. She see's something that is beside her head and hears one voice. Her affect is flat and she is withdrawn and guarded.  She has suicidal thoughts with no plan or intent. She has homicidal thoughts toward her psychotic neighbor and her sisters ex boyfriend. She states she has no way of doing it. She lives alone and is on the list for going to an IRTS.  She states to this writer that she takes her risperdal daily but does not take her prn medications.  She has an autism diagnosis as well as eating disorder, anxiety , depression, SIB and psychosis.  She has been pleasant and has so far cooperative.  Nursing and risk assessments completed.  Assessments reviewed with LMHP and physician. Video monitoring in progress, patient informed.  Admission information reviewed with patient. Patient given a tour of [facility] and instructions on using the facility. Questions regarding [facility] addressed.
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
## 2024-10-11 NOTE — ED NOTES
DEC  at bedside.    Griseofulvin Counseling:  I discussed with the patient the risks of griseofulvin including but not limited to photosensitivity, cytopenia, liver damage, nausea/vomiting and severe allergy.  The patient understands that this medication is best absorbed when taken with a fatty meal (e.g., ice cream or french fries).
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
## 2024-10-11 NOTE — PROGRESS NOTES
Sparkle Menjivar  October 11, 2024  Plan of Care Hand-off Note     Patient Care Path: observation    Plan for Care:   It is the recommendation of writer that patient be in observation at minimum and transfer to [facility].  She expresses disinterest in medications and displaying limited insight into the impact her psychosis is having on her life.  Patient has been self-harming more recently including at work by putting her arm in the oven and driving erractically.  Per collateral, patient is not taking care of basic needs either by not eating or sleeping with any consistency or regularity.  Patient does endores having one voice that is always behind her, does sometimes tell her to do things to harm self.  Patient also report some HI toward people that she does not have direct contact or access to (sister's neighbor, sister's ex-boyfriend, and family program at [facility]).  Patient's presentation during assessment is flat affect, avoidant eye contact, soft spoken, guarded with information, and dismissive to cares/recommendations.  Patient could benefit from inpatient admission if she does not responde positively to [facility] interventions.  There is possible IRTS placement in the works but not available to month's end.    Identified Goals and Safety Issues: (P) stabilize psychosis through medications, reduce intensity of self-harm    Overview:  (P) sister Cardona, cell: [phone], work (medical unit in [facility]): [phone]      Legal Status: Legal Status at Admission: Voluntary/Patient has signed consent for treatment    Psychiatry Consult:  transferring to [facility] and will see their provider    Updated ED Provider, ED RN, and [facility] RN regarding plan of care.      KRISTINE Antunez, [facility]  Licensed Mental Health Professional (LMHP)  [place], [phone]

## 2024-10-11 NOTE — ED NOTES
Pt arousable to voice - offers no complaints at this time. Pt made aware that breakfast has been ordered for her - calm, cooperative

## 2024-10-11 NOTE — ED NOTES
"Pt observed resting on the recliner starring off into space or lying down. She presents as tense, guarded, restless. She endorses auditory hallucinations and anxiety stating, \"It's ok.\" Pt reports NSSIB and states, \"If I could cut I would.\" Pt acknowledges being agreeable to going IP voluntary.   "

## 2024-10-11 NOTE — PHARMACY-ADMISSION MEDICATION HISTORY
Pharmacist Admission Medication History    Admission medication history is complete. The information provided in this note is only as accurate as the sources available at the time of the update.    Information Source(s): Patient, Patient's pharmacy, and CareEverywhere/SureScripts via in-person    Pertinent Information: I called [facility] in [place] to confirm Hydroxyzine 10 mg dose and entered what RX reads. The pharmacy said the 50 mg is closed out but patient states she still uses the 50 mg dose if she is at home.    Changes made to PTA medication list:  Added: Miralax  Deleted: Zyrtec, Multivit  Changed: Risperidone from 0.25 mg ODT to 0.5 mg tabs    Allergies reviewed with patient and updates made in EHR: no    Medication History Completed By: [facility] 10/11/2024 10:41 AM    PTA Med List   Medication Sig Last Dose    hydrOXYzine HCl (ATARAX) 10 MG tablet Take 10-20 mg by mouth every 8 hours as needed for anxiety. Patient uses 10 mg on work days Unknown at prn    hydrOXYzine HCl (ATARAX) 50 MG tablet Take 50 mg by mouth 3 times daily as needed for anxiety. Patient uses 50 mg dose if at home Unknown at prn    polyethylene glycol (MIRALAX) 17 GM/Dose powder Take 1 Capful by mouth every 48 hours as needed for constipation. Unknown at prn    propranolol (INDERAL) 10 MG tablet Take 10 mg by mouth 3 times daily as needed (anxiety). Unknown at prn    risperiDONE (RISPERDAL) 0.5 MG tablet Take 0.25 mg by mouth at bedtime. 10/10/2024

## 2024-10-12 ENCOUNTER — HOSPITAL ENCOUNTER (INPATIENT)
Facility: CLINIC | Age: 24
LOS: 19 days | Discharge: HOME | End: 2024-10-31
Attending: PSYCHIATRY & NEUROLOGY | Admitting: PSYCHIATRY & NEUROLOGY
Payer: COMMERCIAL

## 2024-10-12 VITALS
SYSTOLIC BLOOD PRESSURE: 99 MMHG | TEMPERATURE: 97.6 F | DIASTOLIC BLOOD PRESSURE: 64 MMHG | OXYGEN SATURATION: 98 % | WEIGHT: 141 LBS | BODY MASS INDEX: 21.37 KG/M2 | HEIGHT: 68 IN | RESPIRATION RATE: 16 BRPM | HEART RATE: 47 BPM

## 2024-10-12 DIAGNOSIS — K59.00 CONSTIPATION, UNSPECIFIED CONSTIPATION TYPE: ICD-10-CM

## 2024-10-12 DIAGNOSIS — F29 PSYCHOSIS, ATYPICAL (H): Primary | ICD-10-CM

## 2024-10-12 DIAGNOSIS — F20.9 SCHIZOPHRENIA, UNSPECIFIED TYPE (H): ICD-10-CM

## 2024-10-12 DIAGNOSIS — F41.9 ANXIETY: ICD-10-CM

## 2024-10-12 DIAGNOSIS — R44.3 HALLUCINATIONS: ICD-10-CM

## 2024-10-12 LAB
ALBUMIN SERPL BCG-MCNC: 4.3 G/DL (ref 3.5–5.2)
ALP SERPL-CCNC: 43 U/L (ref 40–150)
ALT SERPL W P-5'-P-CCNC: 6 U/L (ref 0–50)
ANION GAP SERPL CALCULATED.3IONS-SCNC: 11 MMOL/L (ref 7–15)
AST SERPL W P-5'-P-CCNC: 20 U/L (ref 0–45)
BASOPHILS # BLD AUTO: 0.1 10E3/UL (ref 0–0.2)
BASOPHILS NFR BLD AUTO: 1 %
BILIRUB SERPL-MCNC: 0.6 MG/DL
BUN SERPL-MCNC: 8.2 MG/DL (ref 6–20)
CALCIUM SERPL-MCNC: 9.5 MG/DL (ref 8.8–10.4)
CHLORIDE SERPL-SCNC: 105 MMOL/L (ref 98–107)
CREAT SERPL-MCNC: 0.68 MG/DL (ref 0.51–0.95)
EGFRCR SERPLBLD CKD-EPI 2021: >90 ML/MIN/1.73M2
EOSINOPHIL # BLD AUTO: 0.1 10E3/UL (ref 0–0.7)
EOSINOPHIL NFR BLD AUTO: 1 %
ERYTHROCYTE [DISTWIDTH] IN BLOOD BY AUTOMATED COUNT: 12.3 % (ref 10–15)
EST. AVERAGE GLUCOSE BLD GHB EST-MCNC: 80 MG/DL
FOLATE SERPL-MCNC: 11.6 NG/ML (ref 4.6–34.8)
GLUCOSE SERPL-MCNC: 88 MG/DL (ref 70–99)
HBA1C MFR BLD: 4.4 %
HCO3 SERPL-SCNC: 23 MMOL/L (ref 22–29)
HCT VFR BLD AUTO: 37.2 % (ref 35–47)
HGB BLD-MCNC: 12.2 G/DL (ref 11.7–15.7)
IMM GRANULOCYTES # BLD: 0 10E3/UL
IMM GRANULOCYTES NFR BLD: 0 %
LYMPHOCYTES # BLD AUTO: 1.8 10E3/UL (ref 0.8–5.3)
LYMPHOCYTES NFR BLD AUTO: 30 %
MCH RBC QN AUTO: 30.3 PG (ref 26.5–33)
MCHC RBC AUTO-ENTMCNC: 32.8 G/DL (ref 31.5–36.5)
MCV RBC AUTO: 92 FL (ref 78–100)
MONOCYTES # BLD AUTO: 0.4 10E3/UL (ref 0–1.3)
MONOCYTES NFR BLD AUTO: 6 %
NEUTROPHILS # BLD AUTO: 3.8 10E3/UL (ref 1.6–8.3)
NEUTROPHILS NFR BLD AUTO: 62 %
NRBC # BLD AUTO: 0 10E3/UL
NRBC BLD AUTO-RTO: 0 /100
PLATELET # BLD AUTO: 216 10E3/UL (ref 150–450)
POTASSIUM SERPL-SCNC: 4.1 MMOL/L (ref 3.4–5.3)
PROT SERPL-MCNC: 7 G/DL (ref 6.4–8.3)
RBC # BLD AUTO: 4.03 10E6/UL (ref 3.8–5.2)
SODIUM SERPL-SCNC: 139 MMOL/L (ref 135–145)
VIT B12 SERPL-MCNC: 414 PG/ML (ref 232–1245)
VIT D+METAB SERPL-MCNC: 27 NG/ML (ref 20–50)
WBC # BLD AUTO: 6.1 10E3/UL (ref 4–11)

## 2024-10-12 PROCEDURE — 250N000013 HC RX MED GY IP 250 OP 250 PS 637: Performed by: EMERGENCY MEDICINE

## 2024-10-12 PROCEDURE — 36415 COLL VENOUS BLD VENIPUNCTURE: CPT | Performed by: REGISTERED NURSE

## 2024-10-12 PROCEDURE — 85004 AUTOMATED DIFF WBC COUNT: CPT | Performed by: REGISTERED NURSE

## 2024-10-12 PROCEDURE — 80053 COMPREHEN METABOLIC PANEL: CPT | Performed by: REGISTERED NURSE

## 2024-10-12 PROCEDURE — 250N000013 HC RX MED GY IP 250 OP 250 PS 637: Performed by: REGISTERED NURSE

## 2024-10-12 PROCEDURE — 82746 ASSAY OF FOLIC ACID SERUM: CPT | Performed by: REGISTERED NURSE

## 2024-10-12 PROCEDURE — 128N000002 HC R&B CD/MH ADOLESCENT

## 2024-10-12 PROCEDURE — G0378 HOSPITAL OBSERVATION PER HR: HCPCS

## 2024-10-12 PROCEDURE — 93010 ELECTROCARDIOGRAM REPORT: CPT | Performed by: INTERNAL MEDICINE

## 2024-10-12 PROCEDURE — 99223 1ST HOSP IP/OBS HIGH 75: CPT | Mod: AI | Performed by: REGISTERED NURSE

## 2024-10-12 PROCEDURE — 83036 HEMOGLOBIN GLYCOSYLATED A1C: CPT | Performed by: REGISTERED NURSE

## 2024-10-12 PROCEDURE — 250N000011 HC RX IP 250 OP 636: Performed by: EMERGENCY MEDICINE

## 2024-10-12 PROCEDURE — 82306 VITAMIN D 25 HYDROXY: CPT | Performed by: REGISTERED NURSE

## 2024-10-12 PROCEDURE — 82607 VITAMIN B-12: CPT | Performed by: REGISTERED NURSE

## 2024-10-12 RX ORDER — ACETAMINOPHEN 325 MG/1
650 TABLET ORAL EVERY 4 HOURS PRN
Status: DISCONTINUED | OUTPATIENT
Start: 2024-10-12 | End: 2024-10-31 | Stop reason: HOSPADM

## 2024-10-12 RX ORDER — POLYETHYLENE GLYCOL 3350 17 G/17G
17 POWDER, FOR SOLUTION ORAL
Status: DISCONTINUED | OUTPATIENT
Start: 2024-10-12 | End: 2024-10-12

## 2024-10-12 RX ORDER — OLANZAPINE 10 MG/2ML
5 INJECTION, POWDER, FOR SOLUTION INTRAMUSCULAR 3 TIMES DAILY PRN
Status: DISCONTINUED | OUTPATIENT
Start: 2024-10-12 | End: 2024-10-31 | Stop reason: HOSPADM

## 2024-10-12 RX ORDER — OLANZAPINE 10 MG/2ML
10 INJECTION, POWDER, FOR SOLUTION INTRAMUSCULAR 3 TIMES DAILY PRN
Status: DISCONTINUED | OUTPATIENT
Start: 2024-10-12 | End: 2024-10-12

## 2024-10-12 RX ORDER — OLANZAPINE 10 MG/1
10 TABLET ORAL 3 TIMES DAILY PRN
Status: DISCONTINUED | OUTPATIENT
Start: 2024-10-12 | End: 2024-10-12

## 2024-10-12 RX ORDER — PROPRANOLOL HYDROCHLORIDE 10 MG/1
10 TABLET ORAL 3 TIMES DAILY PRN
Status: DISCONTINUED | OUTPATIENT
Start: 2024-10-12 | End: 2024-10-31 | Stop reason: HOSPADM

## 2024-10-12 RX ORDER — OLANZAPINE 2.5 MG/1
2.5-5 TABLET, FILM COATED ORAL 3 TIMES DAILY PRN
Status: DISCONTINUED | OUTPATIENT
Start: 2024-10-12 | End: 2024-10-31 | Stop reason: HOSPADM

## 2024-10-12 RX ORDER — HYDROXYZINE HYDROCHLORIDE 50 MG/1
50 TABLET, FILM COATED ORAL 3 TIMES DAILY PRN
Status: DISCONTINUED | OUTPATIENT
Start: 2024-10-12 | End: 2024-10-21

## 2024-10-12 RX ORDER — POLYETHYLENE GLYCOL 3350 17 G/17G
17 POWDER, FOR SOLUTION ORAL AT BEDTIME
Status: DISCONTINUED | OUTPATIENT
Start: 2024-10-12 | End: 2024-10-31 | Stop reason: HOSPADM

## 2024-10-12 RX ORDER — POLYETHYLENE GLYCOL 3350 17 G/17G
17 POWDER, FOR SOLUTION ORAL DAILY PRN
Status: DISCONTINUED | OUTPATIENT
Start: 2024-10-12 | End: 2024-10-12

## 2024-10-12 RX ORDER — ARIPIPRAZOLE 10 MG/1
10 TABLET ORAL AT BEDTIME
Status: DISCONTINUED | OUTPATIENT
Start: 2024-10-12 | End: 2024-10-14

## 2024-10-12 RX ORDER — RISPERIDONE 0.5 MG/1
0.5 TABLET ORAL AT BEDTIME
Status: DISCONTINUED | OUTPATIENT
Start: 2024-10-12 | End: 2024-10-12

## 2024-10-12 RX ORDER — HYDROXYZINE HYDROCHLORIDE 25 MG/1
25 TABLET, FILM COATED ORAL EVERY 4 HOURS PRN
Status: DISCONTINUED | OUTPATIENT
Start: 2024-10-12 | End: 2024-10-12

## 2024-10-12 RX ORDER — MAGNESIUM HYDROXIDE/ALUMINUM HYDROXICE/SIMETHICONE 120; 1200; 1200 MG/30ML; MG/30ML; MG/30ML
30 SUSPENSION ORAL EVERY 4 HOURS PRN
Status: DISCONTINUED | OUTPATIENT
Start: 2024-10-12 | End: 2024-10-31 | Stop reason: HOSPADM

## 2024-10-12 RX ORDER — ONDANSETRON 4 MG/1
4 TABLET, ORALLY DISINTEGRATING ORAL EVERY 6 HOURS PRN
Status: DISCONTINUED | OUTPATIENT
Start: 2024-10-12 | End: 2024-10-12 | Stop reason: HOSPADM

## 2024-10-12 RX ADMIN — POLYETHYLENE GLYCOL 3350 17 G: 17 POWDER, FOR SOLUTION ORAL at 19:57

## 2024-10-12 RX ADMIN — ONDANSETRON 4 MG: 4 TABLET, ORALLY DISINTEGRATING ORAL at 02:11

## 2024-10-12 RX ADMIN — HYDROXYZINE HYDROCHLORIDE 25 MG: 25 TABLET, FILM COATED ORAL at 02:11

## 2024-10-12 RX ADMIN — ARIPIPRAZOLE 10 MG: 10 TABLET ORAL at 19:57

## 2024-10-12 ASSESSMENT — ACTIVITIES OF DAILY LIVING (ADL)
ADLS_ACUITY_SCORE: 28
ADLS_ACUITY_SCORE: 35
ADLS_ACUITY_SCORE: 35
ADLS_ACUITY_SCORE: 28
ADLS_ACUITY_SCORE: 28
ADLS_ACUITY_SCORE: 45
ADLS_ACUITY_SCORE: 28
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
## 2024-10-12 NOTE — ED NOTES
Pt and her sister Dulce, MAHNAZ on file, were updated on pending admission to [place] [facility]. [facility] will receive report after midnight. Pt agreeable with transfer and signed EMTALA form. Pt has spent time resting on the recliner listening to headphones. She ate about 25% of her dinner.
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
## 2024-10-12 NOTE — PROGRESS NOTES
EMS came to  pt and transport to [place] [facility]. Pt belongings returned. Before departing, pt c/o feeling nauseous and anxious. ED provider called to get order for zofran; order received. PRN atarax and zofran given per order. Pt left the unit around 0210. In addition, writer notified receiving nurse that pt just left [facility].

## 2024-10-12 NOTE — PHARMACY-ADMISSION MEDICATION HISTORY
Please see Admission Medication reconciliation note placed 10/11/2024 for information regarding prior to admission medications.     Christal Lopez, PharmD  PGY-2 Behavioral Health Pharmacy Resident  [facility] ([facility])  Available on [facility]

## 2024-10-12 NOTE — PLAN OF CARE
"  Problem: Suicide Risk  Goal: Absence of Self-Harm  Outcome: Progressing   Goal Outcome Evaluation:     Plan of Care Reviewed With: patient     Pt isolated in her room. Was quite, withdrawn, and guarded. Ate 100% meals. Was pleasant, cooperative, and med compliant. EKG completed. Was started on Abilify 10 mg and Miralax at bedtime. Risperidone was completed. Labs ordered. Endorsed hearing voices and being anxious and depressed. Denied all other psych symptoms. Denied SI and SIB. Contracted for safety. No verbal outbursts and no behavioral issues noted.  Will continue to monitor.     /70 (BP Location: Right arm, Patient Position: Sitting)   Pulse 59   Temp 98.2  F (36.8  C)   Ht 1.727 m (5' 8\")   Wt 59.8 kg (131 lb 12.8 oz)   LMP 08/07/2024   SpO2 100%   BMI 20.04 kg/m        "

## 2024-10-12 NOTE — PROGRESS NOTES
10/12/24 0319   Patient Belongings   Did you bring any home meds/supplements to the hospital?  No   Patient Belongings locker;sent to security per site process;other (see comments)   Patient Belongings Put in Hospital Secure Location (Security or Locker, etc.) cash/credit card;cell phone/electronics;clothing;plastic bag;purse/wallet;other (see comments)   Belongings Search Yes   Clothing Search Yes   Second Staff Kimberly S and Yifan S     Patient items in the locker: 1 pair of jeans,1 sweater,pair of socks,4 chopstick,2 lipstick,1 nail clipper,1 cell phone,1 cell phone ,1 book,1 Vaper,pair of ear pod,1 lotion,pair of shoes, hand cream, health insurance card and  [place]  license.     Patient belongings sent to security: 1 [employer] visa card (9133), [employer] card ([id number]) and wells   [place] visa card([id number]).     Brought in 10/13  -Box of Feminine Pads  - Green and White Sweater  - Yellow Sweater  - Brown Hoodie  - 2 Brown Pants  - Grey Pants   - 7 Pairs of Underwear  - Shampoo  - Conditioner  - Curl Enhancing Hair Smoothie  - Face Lotion    Brought in 10/25/24:  -The instant dimitrios  -Red dress  -Book: Notes of the Underground the Double  -Book: Leigh Unabridged Library Classics  -Blue sweater  -White sweater  -Blue gown  -Mechelle phippsnt    A               Admission:  I am responsible for any personal items that are not sent to the safe or pharmacy.  [facility] is not responsible for loss, theft or damage of any property in my possession.    Signature:  _________________________________ Date: _______  Time: _____                                              Staff Signature:  ____________________________ Date: ________  Time: _____      2nd Staff person, if patient is unable/unwilling to sign:    Signature: ________________________________ Date: ________  Time: _____     Discharge:  [facility] has returned all of my personal belongings:    Signature: _________________________________ Date: ________  Time: _____                                           Staff Signature:  ____________________________ Date: ________  Time: _____

## 2024-10-12 NOTE — PLAN OF CARE
"Problem: Psychotic Symptoms  Goal: Psychotic Symptoms  Description: Signs and symptoms of listed problems will be absent or manageable.  Outcome: Not Progressing   Goal Outcome Evaluation:    /70 (BP Location: Left arm, Patient Position: Sitting, Cuff Size: Adult Regular)   Pulse 69   Temp 97.3  F (36.3  C) (Temporal)   Resp 18   Ht 1.727 m (5' 8\")   Wt 59.8 kg (131 lb 12.8 oz)   LMP 08/07/2024   SpO2 100%   BMI 20.04 kg/m    Pt paced the hallway intermittently. She appeared depressed, affect was flat and blunted and mood was tense. On approach she was guarded and shrugged her shoulders to most of the assessments. When asked if she had suicide thoughts, she stated, \" I am trying not to\"..........pt seemed to minimize her mental symptoms. She denied all mental health symptoms and pain.She ate 100% off her dinner from her room. She was medication compliant, denied any medication side effects.  "
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
## 2024-10-13 LAB
ATRIAL RATE - MUSE: 57 BPM
DIASTOLIC BLOOD PRESSURE - MUSE: NORMAL MMHG
INTERPRETATION ECG - MUSE: NORMAL
P AXIS - MUSE: 32 DEGREES
PR INTERVAL - MUSE: 144 MS
QRS DURATION - MUSE: 94 MS
QT - MUSE: 410 MS
QTC - MUSE: 399 MS
R AXIS - MUSE: 79 DEGREES
SYSTOLIC BLOOD PRESSURE - MUSE: NORMAL MMHG
T AXIS - MUSE: 37 DEGREES
VENTRICULAR RATE- MUSE: 57 BPM

## 2024-10-13 PROCEDURE — 250N000013 HC RX MED GY IP 250 OP 250 PS 637: Performed by: REGISTERED NURSE

## 2024-10-13 PROCEDURE — 250N000013 HC RX MED GY IP 250 OP 250 PS 637: Performed by: PSYCHIATRY & NEUROLOGY

## 2024-10-13 PROCEDURE — 128N000002 HC R&B CD/MH ADOLESCENT

## 2024-10-13 RX ADMIN — ARIPIPRAZOLE 10 MG: 10 TABLET ORAL at 20:43

## 2024-10-13 RX ADMIN — HYDROXYZINE HYDROCHLORIDE 50 MG: 50 TABLET, FILM COATED ORAL at 21:27

## 2024-10-13 RX ADMIN — POLYETHYLENE GLYCOL 3350 17 G: 17 POWDER, FOR SOLUTION ORAL at 20:43

## 2024-10-13 ASSESSMENT — ACTIVITIES OF DAILY LIVING (ADL)
ADLS_ACUITY_SCORE: 28

## 2024-10-13 NOTE — PLAN OF CARE
Problem: Sleep Disturbance  Goal: Adequate Sleep/Rest  Outcome: Progressing   Goal Outcome Evaluation:    Patient slept for 7 hours last night. No indication of pain or discomfort. Safety checks done per protocol. No safety or behavior  concerns at this time. Will continue monitoring.

## 2024-10-13 NOTE — PLAN OF CARE
"  Problem: Suicide Risk  Goal: Absence of Self-Harm  Outcome: Progressing   Goal Outcome Evaluation:     Plan of Care Reviewed With: patient     Pt isolated in her room. Was quite, withdrawn, and guarded. Ate 100% meals. Encouraged to attend music group but declined. Observed walking on the unit hallways alone. Will only engage when approached. Cooperative with labs. Fasting lab orders scheduled tomorrow morning. Endorsed hearing voices and being anxious and depressed. Offered PRN Hydroxyzine but declined, stated \" its not that bad\". Denied all other psych symptoms. Denied SI and SIB. Contracted for safety. No verbal outbursts and no behavioral issues noted. Sister called for update and visitation hours. Will continue to monitor.    /70 (BP Location: Left arm, Patient Position: Sitting, Cuff Size: Adult Regular)   Pulse 69   Temp 97.5  F (36.4  C) (Temporal)   Resp 18   Ht 1.727 m (5' 8\")   Wt 62.9 kg (138 lb 11.2 oz)   LMP 08/07/2024   SpO2 100%   BMI 21.09 kg/m      "

## 2024-10-14 LAB
CHOLEST SERPL-MCNC: 136 MG/DL
HDLC SERPL-MCNC: 57 MG/DL
LDLC SERPL CALC-MCNC: 69 MG/DL
NONHDLC SERPL-MCNC: 79 MG/DL
TRIGL SERPL-MCNC: 50 MG/DL

## 2024-10-14 PROCEDURE — H2032 ACTIVITY THERAPY, PER 15 MIN: HCPCS

## 2024-10-14 PROCEDURE — 250N000013 HC RX MED GY IP 250 OP 250 PS 637: Performed by: REGISTERED NURSE

## 2024-10-14 PROCEDURE — 82465 ASSAY BLD/SERUM CHOLESTEROL: CPT | Performed by: PSYCHIATRY & NEUROLOGY

## 2024-10-14 PROCEDURE — 128N000002 HC R&B CD/MH ADOLESCENT

## 2024-10-14 PROCEDURE — 250N000013 HC RX MED GY IP 250 OP 250 PS 637: Performed by: PSYCHIATRY & NEUROLOGY

## 2024-10-14 PROCEDURE — 99233 SBSQ HOSP IP/OBS HIGH 50: CPT | Performed by: NURSE PRACTITIONER

## 2024-10-14 PROCEDURE — 36415 COLL VENOUS BLD VENIPUNCTURE: CPT | Performed by: PSYCHIATRY & NEUROLOGY

## 2024-10-14 PROCEDURE — 250N000013 HC RX MED GY IP 250 OP 250 PS 637: Performed by: NURSE PRACTITIONER

## 2024-10-14 RX ORDER — ARIPIPRAZOLE 10 MG/1
10 TABLET ORAL DAILY
Status: DISCONTINUED | OUTPATIENT
Start: 2024-10-15 | End: 2024-10-14

## 2024-10-14 RX ORDER — ARIPIPRAZOLE 10 MG/1
10 TABLET ORAL DAILY
Status: DISCONTINUED | OUTPATIENT
Start: 2024-10-14 | End: 2024-10-21

## 2024-10-14 RX ADMIN — HYDROXYZINE HYDROCHLORIDE 50 MG: 50 TABLET, FILM COATED ORAL at 20:09

## 2024-10-14 RX ADMIN — ARIPIPRAZOLE 10 MG: 10 TABLET ORAL at 13:05

## 2024-10-14 RX ADMIN — POLYETHYLENE GLYCOL 3350 17 G: 17 POWDER, FOR SOLUTION ORAL at 19:55

## 2024-10-14 ASSESSMENT — ACTIVITIES OF DAILY LIVING (ADL)
ADLS_ACUITY_SCORE: 28

## 2024-10-14 NOTE — PLAN OF CARE
"Goal Outcome Evaluation:    Problem: Depressive Symptoms  Goal: Social and Therapeutic (Depression)  Description: Signs and symptoms of listed problems will be absent or manageable.  Outcome: Progressing    Pt was isolated in her room, quiet, withdrawn and guarded  in the milieu.  Pt was alert and oriented x 4,  cooperative with staff. VS stable. Affect is flat and blunted and her mood is tense.  Pt reports okay appetite, ate about 75% of dinner with adequate.     Pt denies SI/SIB/HI. Denies visual and auditory hallucinations.      Pt requested PRN hydroxyzine 50 mg for anxiety rating 6/10. Pt was medication compliant, denied pain, medication side effects and medical concerns.    /78 (BP Location: Right arm)   Pulse 66   Temp 96.8  F (36  C) (Temporal)   Resp 18   Ht 1.727 m (5' 8\")   Wt 62.9 kg (138 lb 11.2 oz)   LMP 08/07/2024   SpO2 100%   BMI 21.09 kg/m     "

## 2024-10-14 NOTE — PLAN OF CARE
Problem: Sleep Disturbance  Goal: Adequate Sleep/Rest  Outcome: Progressing   Goal Outcome Evaluation:    Patient slept for 7 hours. No indication of pain or or discomfort. Safety rounds implemented with no occurrences. No behavior or safety concerns at this time. Will continue to monitor.

## 2024-10-14 NOTE — PROGRESS NOTES
"[facility], [place]   Psychiatric Progress Note        Interim History:   Team meeting report: The patient's care was discussed with the treatment team during the daily team meeting and/or staff's chart notes were reviewed.  Staff report patient has been isolated to her room.  She is very quiet and withdrawn.  She is guarded.  She ate 100% of her meals.  Patient was pleasant and cooperative.  She was med compliant.  Reported hearing voices.  Reports feeling depressed and anxious.  Denies suicidal ideation and self-injury behaviors.  In the evening, the patient was seen pacing in the hallway from time to time.  Appears to be quite depressed.  Affect is flat.  Mood was tense at times.  Did not want to share much information.  When asked if she is suicidal, the patient reported that she is trying not to be.  She is minimizing her mental health symptoms.  Ate well.  Slept through the night.    Met with patient.  She is sitting on the table in her room in the dark trying to read a book by the window.  States she is here because she is not trustworthy and she cannot take care of herself.  When asked to clarify, the patient stated that she cannot do her \"ADLs\".  When prompted for more information, the patient stated that she does not want to do her ADLs and the voices in her head is telling her she does not need to do that.  When asked to describe the voice, the patient stated that it is neither a male or female, it has always present but sometimes it is quiet and other times is very loud.  The voice is usually urging her to harm herself.  She is feeling quite depressed and anxious.  Denies panic attacks.  She feels hopeless and helpless.  She has not been taking her medications as prescribed.  She would like to take the Abilify in the morning instead of bedtime which is reasonable.  Discussed her history.  States she was hospitalized in April at a different hospital.  She also had 2 ER visits " in the last 6 months for the same issue.  Denies suicidal ideation and homicidal ideation.  She does have urges to harm herself but is manjula for safety.  Sleep has been good.  Appetite is intact.    Spoke with patient's sister Dulce Menjivar, [phone].  She wants to make sure that whoever is working with [facility] knows that the patient is currently unsafe to live on her own or make any types of her decisions.  Believes that the patient will benefit immensely from going to an IRTS program which she believes is only safe place for her.  The patient has an outpatient  who has been making referrals for IRTS but Ms. Menjivar does not know where to.  States they have applied for a CADI waiver.           Medications:     Current Facility-Administered Medications   Medication Dose Route Frequency Provider Last Rate Last Admin    ARIPiprazole (ABILIFY) tablet 10 mg  10 mg Oral At Bedtime Sandra Mayers APRN CNP   10 mg at 10/13/24 2043    polyethylene glycol (MIRALAX) Packet 17 g  17 g Oral At Bedtime Sandra Mayers APRN CNP   17 g at 10/13/24 2043            Allergies:     Allergies   Allergen Reactions    Cats     Seasonal Allergies             Labs:     Recent Results (from the past 672 hour(s))   HCG qualitative urine    Collection Time: 10/11/24  9:22 AM   Result Value Ref Range    hCG Urine Qualitative Negative Negative   Urine Drug Screen Panel    Collection Time: 10/11/24  9:22 AM   Result Value Ref Range    Amphetamines Urine Screen Negative Screen Negative    Barbituates Urine Screen Negative Screen Negative    Benzodiazepine Urine Screen Negative Screen Negative    Cannabinoids Urine Screen Negative Screen Negative    Cocaine Urine Screen Negative Screen Negative    Fentanyl Qual Urine Screen Negative Screen Negative    Opiates Urine Screen Negative Screen Negative    PCP Urine Screen Negative Screen Negative   Asymptomatic COVID-19 Virus (Coronavirus) by PCR Nose    Collection  Time: 10/11/24  6:16 PM    Specimen: Nose; Swab   Result Value Ref Range    SARS CoV2 PCR Negative Negative   EKG 12-lead, tracing only    Collection Time: 10/12/24 10:32 AM   Result Value Ref Range    Systolic Blood Pressure  mmHg    Diastolic Blood Pressure  mmHg    Ventricular Rate 57 BPM    Atrial Rate 57 BPM    SC Interval 144 ms    QRS Duration 94 ms     ms    QTc 399 ms    P Axis 32 degrees    R AXIS 79 degrees    T Axis 37 degrees    Interpretation ECG       Sinus bradycardia  Otherwise normal ECG  No previous ECGs available  Confirmed by MD VIVIAN, LANETTE ([id number]) on 10/13/2024 10:23:47 AM     Vitamin D Deficiency    Collection Time: 10/12/24 11:21 AM   Result Value Ref Range    Vitamin D, Total (25-Hydroxy) 27 20 - 50 ng/mL   Hemoglobin A1c    Collection Time: 10/12/24 11:21 AM   Result Value Ref Range    Estimated Average Glucose 80 <117 mg/dL    Hemoglobin A1C 4.4 <5.7 %   Folate    Collection Time: 10/12/24 11:21 AM   Result Value Ref Range    Folic Acid 11.6 4.6 - 34.8 ng/mL   Vitamin B12    Collection Time: 10/12/24 11:21 AM   Result Value Ref Range    Vitamin B12 414 232 - 1,245 pg/mL   Comprehensive metabolic panel    Collection Time: 10/12/24 11:21 AM   Result Value Ref Range    Sodium 139 135 - 145 mmol/L    Potassium 4.1 3.4 - 5.3 mmol/L    Carbon Dioxide (CO2) 23 22 - 29 mmol/L    Anion Gap 11 7 - 15 mmol/L    Urea Nitrogen 8.2 6.0 - 20.0 mg/dL    Creatinine 0.68 0.51 - 0.95 mg/dL    GFR Estimate >90 >60 mL/min/1.73m2    Calcium 9.5 8.8 - 10.4 mg/dL    Chloride 105 98 - 107 mmol/L    Glucose 88 70 - 99 mg/dL    Alkaline Phosphatase 43 40 - 150 U/L    AST 20 0 - 45 U/L    ALT 6 0 - 50 U/L    Protein Total 7.0 6.4 - 8.3 g/dL    Albumin 4.3 3.5 - 5.2 g/dL    Bilirubin Total 0.6 <=1.2 mg/dL   CBC with platelets and differential    Collection Time: 10/12/24 11:21 AM   Result Value Ref Range    WBC Count 6.1 4.0 - 11.0 10e3/uL    RBC Count 4.03 3.80 - 5.20 10e6/uL    Hemoglobin 12.2 11.7 - 15.7  g/dL    Hematocrit 37.2 35.0 - 47.0 %    MCV 92 78 - 100 fL    MCH 30.3 26.5 - 33.0 pg    MCHC 32.8 31.5 - 36.5 g/dL    RDW 12.3 10.0 - 15.0 %    Platelet Count 216 150 - 450 10e3/uL    % Neutrophils 62 %    % Lymphocytes 30 %    % Monocytes 6 %    % Eosinophils 1 %    % Basophils 1 %    % Immature Granulocytes 0 %    NRBCs per 100 WBC 0 <1 /100    Absolute Neutrophils 3.8 1.6 - 8.3 10e3/uL    Absolute Lymphocytes 1.8 0.8 - 5.3 10e3/uL    Absolute Monocytes 0.4 0.0 - 1.3 10e3/uL    Absolute Eosinophils 0.1 0.0 - 0.7 10e3/uL    Absolute Basophils 0.1 0.0 - 0.2 10e3/uL    Absolute Immature Granulocytes 0.0 <=0.4 10e3/uL    Absolute NRBCs 0.0 10e3/uL   Lipid panel reflex to direct LDL    Collection Time: 10/14/24  7:59 AM   Result Value Ref Range    Cholesterol 136 <200 mg/dL    Triglycerides 50 <150 mg/dL    Direct Measure HDL 57 >=50 mg/dL    LDL Cholesterol Calculated 69 <100 mg/dL    Non HDL Cholesterol 79 <130 mg/dL            Precautions:     Behavioral Orders   Procedures    Code 1 - Restrict to Unit    Routine Programming     As clinically indicated    Status 15     Every 15 minutes.            Psychiatric Examination:   Temp: 97.6  F (36.4  C) Temp src: Temporal BP: 108/74 Pulse: 76     SpO2: 100 % O2 Device: None (Room air)    Weight is 138 lbs 11.2 oz  Body mass index is 21.09 kg/m .    Appearance: awake, alert and well groomed  Attitude:  evasive and guarded  Eye Contact:  poor   Mood:  anxious and depressed  Affect:  mood congruent  Speech:  dysarthria  Psychomotor Behavior:  no evidence of tardive dyskinesia, dystonia, or tics  Throught Process:  linear  Associations:  no loose associations  Thought Content:  no evidence of suicidal ideation or homicidal ideation, auditory hallucinations present, and endorses urges to harm herself but is able to contract for safety  Insight:  limited  Judgement:  limited  Oriented to:  time, person, and place  Attention Span and Concentration:  fair  Recent and Remote  Memory:  fair         Precautions:     Behavioral Orders   Procedures    Code 1 - Restrict to Unit    Routine Programming     As clinically indicated    Status 15     Every 15 minutes.          DIagnoses:   Unspecified mood disorder with psychosis versus schizophrenia spectrum disorder  Unspecified anxiety disorder  Autism spectrum disorder, by history  Eating disorder, by history  Trauma history, rule out PTSD  Rule out cluster B traits         Plan:   Medications:  -- Risperidone was discontinued in the emergency room  -- Abilify, 10 mg every morning for psychosis and mood stabilization  -- Additional medications include propranolol, Zyprexa, melatonin, hydroxyzine    Medical:  --Internal medicine to follow up for medical problems       Consults:   --Care was coordinated with the treatment team.   --The patient was consulted on nature of illness and treatment options.      Disposition Plan   Reason for ongoing admission: is unable to care for self due to severe psychosis or humberto  Discharge location: [facility] facility  Discharge Medications: not ordered  Follow-up Appointments: not scheduled  Legal Status: voluntary   Discharge will be granted once symptoms improved.    Robby Soriano APRN, CNP    This note was created with the help of Dragon dictation system. All grammatical/typing errors or context distortion are unintentional and inherent to software.

## 2024-10-14 NOTE — PLAN OF CARE
BEH IP Unit Acuity Rating Score (UARS)  Patient is given one point for every criteria they meet.    CRITERIA SCORING   On a 72 hour hold, court hold, committed, stay of commitment, or revocation. 0    Patient LOS on BEH unit exceeds 20 days. 0  LOS: 2   Patient under guardianship, 55+, otherwise medically complex, or under age 11. 0   Suicide ideation without relief of precipitating factors. 1   Current plan for suicide. 0   Current plan for homicide. 0   Imminent risk or actual attempt to seriously harm another without relief of factors precipitating the attempt. 0   Severe dysfunction in daily living (ex: complete neglect for self care, extreme disruption in vegetative function, extreme deterioration in social interactions). 1   Recent (last 7 days) or current physical aggression in the ED or on unit. 0   Restraints or seclusion episode in past 72 hours. 0   Recent (last 7 days) or current verbal aggression, agitation, yelling, etc., while in the ED or unit. 0   Active psychosis. 1   Need for constant or near constant redirection (from leaving, from others, etc).  0   Intrusive or disruptive behaviors. 0   Patient requires 3 or more hours of individualized nursing care per 8-hour shift (i.e. for ADLs, meds, therapeutic interventions). 0   TOTAL 3

## 2024-10-14 NOTE — PROGRESS NOTES
10/14/24 1114   Individualization/Patient Specific Goals   Patient Vulnerabilities family/relationship conflict;history of unsuccessful treatment;lacks insight into illness;poor impulse control;limited social skills   Interprofessional Rounds   Summary There was discussion about pt's current presentation on the unit   Participants advanced practice nurse;nursing;CTC   Behavioral Team Discussion   Participants Nahid SANTOS; Jazmin Thomas RN; Natalie Brito CTC   Progress Minimal. Recent admit   Anticipated length of stay 3 to 5 days   Continued Stay Criteria/Rationale SI and AH   Medical/Physical See H&P   Precautions See below   Plan Stablize on medications and discharge to IRTS   Rationale for change in precautions or plan Intial plan   Safety Plan Safe secure milieu   Anticipated Discharge Disposition IRTS     PRECAUTIONS AND SAFETY    Behavioral Orders   Procedures    Code 1 - Restrict to Unit    Routine Programming     As clinically indicated    Status 15     Every 15 minutes.       Safety  Safety WDL: WDL  Patient Location: patient room, own  Observed Behavior: calm  Safety Measures: safety rounds completed  Suicidality: Status 15

## 2024-10-14 NOTE — PLAN OF CARE
Team Note Due:    Assessment/Intervention/Current Symptoms and Care Coordination  Chart review and met with team, discussed pt progress, symptomology, and response to treatment.  Discussed the discharge plan and any potential impediments to discharge.    [facility] spoke with pt's sister who expressed concerns for pt's safety if she were discharged home. Pt's sister would like to see pt discharged to IRTS program. [facility] was given contact information for pt's .    CTC called pt's  CM and left  requesting a call back.       Discharge Plan or Goal  Pending stabilization & development of a safe discharge plan.    Dispo Plan:Intensive Residential Treatment Services (IRTS):    Medications ordered/sent to:  Provisional Discharge required:     Barriers to Discharge   Patient requires further psychiatric stabilization due to current symptomology    Referral Status  Intensive Residential Treatment Services (IRTS):     Legal Status  Patient is voluntary        Contacts:  MAHNAZ signed 10/12/24 Dulcejames Menjivar (sister) [phone]     MAHNAZ signed for Yaz Pimentel () [phone]      Upcoming Meetings and Dates/Important Information and next steps:  [facility] will connect with pt's  CM to discuss IRTS referrals

## 2024-10-14 NOTE — PROGRESS NOTES
"Rehab Group     Start time: 1015  End time: 1135  Patient time total: 30 minutes     Attended partial session     #7 attended   Group Type: self-care, relaxation, and music   Group Topic Covered: coping skills, emotional regulation, and healthy leisure time      Group Session Detail:  1) HUMS (Healthy-Unhealthy Uses of Music Scale) Assessment (patient-reported)     2) Worked on compiling Personal Playlists of music.  Categories were: \"Rainy Day\", (Songs relating to how you feel on a bad day), \"Motivational\", (Songs that inspire, motivate and energize you) and \"Balanced Mood\", (expressing a variety of moods, from least happy to most happy).         Patient Response/Contribution:  actively engaged      Patient Detail:  Pt entered group after HUMS assessment was given, but accepted the personal playlist written prompt.       Pts affect was flat, sullen, guarded.      Pt selected a melancholy song by a female ochoa, then exited group shortly after and did not return to morning sessions.     Pt did not interact with peers, and responded briefly to MT, only when prompted.    This was pts first Music Therapy group here.  Will continue to offer group MT sessions as scheduled.        Activity Therapy Per 15 min ()    Patient Active Problem List   Diagnosis    Juvenile idiopathic scoliosis, unspecified spinal region    Raynaud's phenomenon without gangrene    Psychosis (H)    Suicidal ideation    Hallucinations    Psychosis, atypical (H)       "

## 2024-10-14 NOTE — PLAN OF CARE
"  Problem: Adult Behavioral Health Plan of Care  Goal: Plan of Care Review  Outcome: Progressing   Goal Outcome Evaluation:    Pt was met in her room  Had labs this morning  Ate breakfast in the room   Quite/soft spoken. Hesitant to make eye contact  Avoids social contact/guarded   Isolative and mostly withdrawn to her room    Pt stated she feels \"awful\" about being here  Pt mentioned that her sister brought her to the hospital because she thinks she needs more monitoring.   Anxious about being here- feels like \"dull\"  Rated 8/10  Endorsed SI/SIB though contracts for safety     Encouraged pt to attend group  No scheduled medication this morning. Pt stated that Abilify be moved to morning instead of bedtime- provider notified.       Recommendation- monitor pt for any SIB urges   Pt denied any pain or medical concern   No PRN given       "
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
## 2024-10-15 PROCEDURE — H2032 ACTIVITY THERAPY, PER 15 MIN: HCPCS

## 2024-10-15 PROCEDURE — 90832 PSYTX W PT 30 MINUTES: CPT

## 2024-10-15 PROCEDURE — 128N000002 HC R&B CD/MH ADOLESCENT

## 2024-10-15 PROCEDURE — 99232 SBSQ HOSP IP/OBS MODERATE 35: CPT | Performed by: NURSE PRACTITIONER

## 2024-10-15 PROCEDURE — 250N000013 HC RX MED GY IP 250 OP 250 PS 637: Performed by: NURSE PRACTITIONER

## 2024-10-15 PROCEDURE — 250N000013 HC RX MED GY IP 250 OP 250 PS 637: Performed by: REGISTERED NURSE

## 2024-10-15 PROCEDURE — 97150 GROUP THERAPEUTIC PROCEDURES: CPT | Mod: GO

## 2024-10-15 RX ORDER — GABAPENTIN 100 MG/1
100 CAPSULE ORAL EVERY MORNING
Status: DISCONTINUED | OUTPATIENT
Start: 2024-10-15 | End: 2024-10-31 | Stop reason: HOSPADM

## 2024-10-15 RX ORDER — GABAPENTIN 300 MG/1
300 CAPSULE ORAL AT BEDTIME
Status: DISCONTINUED | OUTPATIENT
Start: 2024-10-15 | End: 2024-10-22

## 2024-10-15 RX ADMIN — GABAPENTIN 300 MG: 300 CAPSULE ORAL at 19:51

## 2024-10-15 RX ADMIN — ARIPIPRAZOLE 10 MG: 10 TABLET ORAL at 08:03

## 2024-10-15 RX ADMIN — POLYETHYLENE GLYCOL 3350 17 G: 17 POWDER, FOR SOLUTION ORAL at 19:50

## 2024-10-15 RX ADMIN — GABAPENTIN 100 MG: 100 CAPSULE ORAL at 11:28

## 2024-10-15 ASSESSMENT — ACTIVITIES OF DAILY LIVING (ADL)
ADLS_ACUITY_SCORE: 28

## 2024-10-15 NOTE — PLAN OF CARE
Patient was recorded asleep for 7hrs during the shift. Patient did not have any behavioral or medical concerns and remain on 15min checks. RN will contiue to monitor.

## 2024-10-15 NOTE — PLAN OF CARE
BEH IP Unit Acuity Rating Score (UARS)  Patient is given one point for every criteria they meet.    CRITERIA SCORING   On a 72 hour hold, court hold, committed, stay of commitment, or revocation. 0    Patient LOS on BEH unit exceeds 20 days. 0  LOS: 3   Patient under guardianship, 55+, otherwise medically complex, or under age 11. 0   Suicide ideation without relief of precipitating factors. 1   Current plan for suicide. 0   Current plan for homicide. 0   Imminent risk or actual attempt to seriously harm another without relief of factors precipitating the attempt. 0   Severe dysfunction in daily living (ex: complete neglect for self care, extreme disruption in vegetative function, extreme deterioration in social interactions). 1   Recent (last 7 days) or current physical aggression in the ED or on unit. 0   Restraints or seclusion episode in past 72 hours. 0   Recent (last 7 days) or current verbal aggression, agitation, yelling, etc., while in the ED or unit. 0   Active psychosis. 1   Need for constant or near constant redirection (from leaving, from others, etc).  0   Intrusive or disruptive behaviors. 0   Patient requires 3 or more hours of individualized nursing care per 8-hour shift (i.e. for ADLs, meds, therapeutic interventions). 0   TOTAL 3

## 2024-10-15 NOTE — PLAN OF CARE
Initial meeting note:    Therapist introduced self to patient and discussed psychotherapy service available to patient.     Pt response: Pt expressed interest in meeting with therapist    Plan: Made plan to meet with pt again; began identifying goals

## 2024-10-15 NOTE — PROGRESS NOTES
"[facility], [place]   Psychiatric Progress Note        Interim History:   Team meeting report: The patient's care was discussed with the treatment team during the daily team meeting and/or staff's chart notes were reviewed.  Staff reports the patient is calm, pleasant, cooperative.  She is isolated to her room.  When out in the milieu, she does not socialize with peers or staff.  Attended 1 group.  She has been visible in the milieu and pacing the hallway with the headphones on.  Appears to be depressed and sad.  Reported auditory hallucinations of whispers only.  Reported anxiety 8/10 being in the hospital.  No suicidal ideation self-injury behaviors.  Med compliant.  Slept through the night.    The patient.  She took a shower this morning.  Responses are not nearly as delayed as they were yesterday.  Eye contact continues to be poor, keeps her eyes down.  States she did not sleep very much last night.  The previous night she slept well with trazodone but last night it did not help.  She had a lot of racing thoughts and anxiety.  She took hydroxyzine but it was not very helpful.  At home, she has used trazodone and melatonin but thinks that that she got is that that they no longer work.  We discussed other options.  Patient does not want to take anything that will cause weight gain.  She was agreeable to try small dose of gabapentin in the morning for anxiety and higher dose at bedtime to help her sleep.  Anxiety continues to be a big problem.  Depression is also rated as high.  Denies suicidal and homicidal ideation, and self-injury behaviors.  Continues to hear a voice that is \"kind of quiet\".  Denies visual hallucinations but feels that someone is behind her watching her.    Discussed disposition.  The patient is hoping to find an IRTS soon as she does not want to stay in the hospital for very long.  She feels confined here.         Medications:     Current Facility-Administered " Medications   Medication Dose Route Frequency Provider Last Rate Last Admin    ARIPiprazole (ABILIFY) tablet 10 mg  10 mg Oral Daily Robby Sorianoeric APRKEVAN CNP   10 mg at 10/15/24 0803    gabapentin (NEURONTIN) capsule 100 mg  100 mg Oral QAM Sharla Sorianotyian DANIELLE Stephenson CNP   100 mg at 10/15/24 1128    gabapentin (NEURONTIN) capsule 300 mg  300 mg Oral At Bedtime Bharatsebaschinmay Robby Stephenson APRN CNP        polyethylene glycol (MIRALAX) Packet 17 g  17 g Oral At Bedtime Sandra MayersDANIELLE CNP   17 g at 10/14/24 1955            Allergies:     Allergies   Allergen Reactions    Cats     Seasonal Allergies             Labs:     Recent Results (from the past 672 hour(s))   HCG qualitative urine    Collection Time: 10/11/24  9:22 AM   Result Value Ref Range    hCG Urine Qualitative Negative Negative   Urine Drug Screen Panel    Collection Time: 10/11/24  9:22 AM   Result Value Ref Range    Amphetamines Urine Screen Negative Screen Negative    Barbituates Urine Screen Negative Screen Negative    Benzodiazepine Urine Screen Negative Screen Negative    Cannabinoids Urine Screen Negative Screen Negative    Cocaine Urine Screen Negative Screen Negative    Fentanyl Qual Urine Screen Negative Screen Negative    Opiates Urine Screen Negative Screen Negative    PCP Urine Screen Negative Screen Negative   Asymptomatic COVID-19 Virus (Coronavirus) by PCR Nose    Collection Time: 10/11/24  6:16 PM    Specimen: Nose; Swab   Result Value Ref Range    SARS CoV2 PCR Negative Negative   EKG 12-lead, tracing only    Collection Time: 10/12/24 10:32 AM   Result Value Ref Range    Systolic Blood Pressure  mmHg    Diastolic Blood Pressure  mmHg    Ventricular Rate 57 BPM    Atrial Rate 57 BPM    NC Interval 144 ms    QRS Duration 94 ms     ms    QTc 399 ms    P Axis 32 degrees    R AXIS 79 degrees    T Axis 37 degrees    Interpretation ECG       Sinus bradycardia  Otherwise normal ECG  No previous ECGs  available  Confirmed by MD VIVIAN, LANETTE ([id number]) on 10/13/2024 10:23:47 AM     Vitamin D Deficiency    Collection Time: 10/12/24 11:21 AM   Result Value Ref Range    Vitamin D, Total (25-Hydroxy) 27 20 - 50 ng/mL   Hemoglobin A1c    Collection Time: 10/12/24 11:21 AM   Result Value Ref Range    Estimated Average Glucose 80 <117 mg/dL    Hemoglobin A1C 4.4 <5.7 %   Folate    Collection Time: 10/12/24 11:21 AM   Result Value Ref Range    Folic Acid 11.6 4.6 - 34.8 ng/mL   Vitamin B12    Collection Time: 10/12/24 11:21 AM   Result Value Ref Range    Vitamin B12 414 232 - 1,245 pg/mL   Comprehensive metabolic panel    Collection Time: 10/12/24 11:21 AM   Result Value Ref Range    Sodium 139 135 - 145 mmol/L    Potassium 4.1 3.4 - 5.3 mmol/L    Carbon Dioxide (CO2) 23 22 - 29 mmol/L    Anion Gap 11 7 - 15 mmol/L    Urea Nitrogen 8.2 6.0 - 20.0 mg/dL    Creatinine 0.68 0.51 - 0.95 mg/dL    GFR Estimate >90 >60 mL/min/1.73m2    Calcium 9.5 8.8 - 10.4 mg/dL    Chloride 105 98 - 107 mmol/L    Glucose 88 70 - 99 mg/dL    Alkaline Phosphatase 43 40 - 150 U/L    AST 20 0 - 45 U/L    ALT 6 0 - 50 U/L    Protein Total 7.0 6.4 - 8.3 g/dL    Albumin 4.3 3.5 - 5.2 g/dL    Bilirubin Total 0.6 <=1.2 mg/dL   CBC with platelets and differential    Collection Time: 10/12/24 11:21 AM   Result Value Ref Range    WBC Count 6.1 4.0 - 11.0 10e3/uL    RBC Count 4.03 3.80 - 5.20 10e6/uL    Hemoglobin 12.2 11.7 - 15.7 g/dL    Hematocrit 37.2 35.0 - 47.0 %    MCV 92 78 - 100 fL    MCH 30.3 26.5 - 33.0 pg    MCHC 32.8 31.5 - 36.5 g/dL    RDW 12.3 10.0 - 15.0 %    Platelet Count 216 150 - 450 10e3/uL    % Neutrophils 62 %    % Lymphocytes 30 %    % Monocytes 6 %    % Eosinophils 1 %    % Basophils 1 %    % Immature Granulocytes 0 %    NRBCs per 100 WBC 0 <1 /100    Absolute Neutrophils 3.8 1.6 - 8.3 10e3/uL    Absolute Lymphocytes 1.8 0.8 - 5.3 10e3/uL    Absolute Monocytes 0.4 0.0 - 1.3 10e3/uL    Absolute Eosinophils 0.1 0.0 - 0.7 10e3/uL     Absolute Basophils 0.1 0.0 - 0.2 10e3/uL    Absolute Immature Granulocytes 0.0 <=0.4 10e3/uL    Absolute NRBCs 0.0 10e3/uL   Lipid panel reflex to direct LDL    Collection Time: 10/14/24  7:59 AM   Result Value Ref Range    Cholesterol 136 <200 mg/dL    Triglycerides 50 <150 mg/dL    Direct Measure HDL 57 >=50 mg/dL    LDL Cholesterol Calculated 69 <100 mg/dL    Non HDL Cholesterol 79 <130 mg/dL            Precautions:     Behavioral Orders   Procedures    Code 1 - Restrict to Unit    Routine Programming     As clinically indicated    Status 15     Every 15 minutes.            Psychiatric Examination:   Temp: 97  F (36.1  C) Temp src: Temporal BP: 117/75 Pulse: 68   Resp: 16 SpO2: 100 % O2 Device: None (Room air)    Weight is 138 lbs 11.2 oz  Body mass index is 21.09 kg/m .    Appearance: awake, alert and well groomed  Attitude:  evasive and guarded  Eye Contact:  poor   Mood:  anxious and depressed  Affect:  mood congruent  Speech:  dysarthria  Psychomotor Behavior:  no evidence of tardive dyskinesia, dystonia, or tics  Throught Process:  linear  Associations:  no loose associations  Thought Content:  no evidence of suicidal ideation or homicidal ideation, auditory hallucinations present, and endorses urges to harm herself but is able to contract for safety  Insight:  limited  Judgement:  limited  Oriented to:  time, person, and place  Attention Span and Concentration:  fair  Recent and Remote Memory:  fair         Precautions:     Behavioral Orders   Procedures    Code 1 - Restrict to Unit    Routine Programming     As clinically indicated    Status 15     Every 15 minutes.          DIagnoses:   Unspecified mood disorder with psychosis versus schizophrenia spectrum disorder  Unspecified anxiety disorder  Autism spectrum disorder, by history  Eating disorder, by history  Trauma history, rule out PTSD  Rule out cluster B traits         Plan:   Medications:  -- Risperidone was discontinued in the emergency room  --  Abilify, 10 mg every morning for psychosis and mood stabilization  --Start gabapentin, 100 mg every morning for anxiety and 300 mg at bedtime for sleep  -- Additional medications include propranolol, Zyprexa, melatonin, hydroxyzine    Medical:  --Internal medicine to follow up for medical problems   --Blood work was reviewed and is within normal limits.  --EKG is WNL      Consults:   --Care was coordinated with the treatment team.   --The patient was consulted on nature of illness and treatment options.      Disposition Plan   Reason for ongoing admission: is unable to care for self due to severe psychosis or humberto  Discharge location: IRTS facility  Discharge Medications: not ordered  Follow-up Appointments: not scheduled  Legal Status: voluntary   Discharge will be granted once symptoms improved.    Referral Status  Intensive Residential Treatment Services (IRTS):   [facility]: Sent 10/15  [facility]: sent 10/15   [facility]: Sent 10/15  [facility]: Sent 10/15    Robby SANTOS, CNP    This note was created with the help of Dragon dictation system. All grammatical/typing errors or context distortion are unintentional and inherent to software.

## 2024-10-15 NOTE — PLAN OF CARE
"Individual Therapy Note      Date of Service: October 15, 2024    Patient: Sparkle goes by \"Sparkle,\" uses she/her pronouns    Individuals Present: Sparkle Willard [facility]    Session start: 1515  Session end: 1545  Session duration in minutes: 30      Modality Used: Person Centered, Rapport Building, and Motivational Interviewing    Goals: Mood stability    Patient Description of current symptoms: hopeless     Mental Status Exam:   Attitude: cooperative  Eye Contact: poor   Mood: sad  and depressed  Affect: mood congruent  Speech: clear, coherent  Psychomotor Behavior: no evidence of tardive dyskinesia, dystonia, or tics  Thought Process:  logical  Associations: no loose associations  Thought Content: no evidence of suicidal ideation or homicidal ideation  Insight: fair  Judgement: fair  Attention Span and Concentration: intact    Pt progress: Patient was guarded and looked down for most of the session. She was soft spoken and appeared sad. Patient feels she doesn't have control of her life and that others are making her decisions. Although she has her own place, she has been living at her sister's a lot and her sister felt patient was unpredictable and everything was a gunn. 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 Patent admitted to sticking her hand in the oven at [employer]. Patient admits to self injury all the time. Patient wants to live an independent life but feels that she is getting int he way of that. We doesn't like being on the unit. We talked about ways she can make the most of this admission. Writer asked her to think about how she can keep herself safe. We will talk about that in next session.    Treatment Objective(s) Addressed:   The focus of this session was on rapport building, orienting the patient to therapy, identifying and practicing coping strategies, and processing feelings related to sadness      Progress Towards Goals and Assessment of " Patient:   Patient is not making progress towards treatment goals as evidenced by being in bed a lot.       Therapeutic Intervention(s):   Provided active listening, unconditional positive regard, and validation.     Plan/next step: Meet for safety planning    53785 - Psychotherapy (with patient) - 30 (16-37*) min    Patient Active Problem List   Diagnosis    Juvenile idiopathic scoliosis, unspecified spinal region    Raynaud's phenomenon without gangrene    Psychosis (H)    Suicidal ideation    Hallucinations    Psychosis, atypical (H)

## 2024-10-15 NOTE — PLAN OF CARE
"Goal Outcome Evaluation:    Plan of Care Reviewed With: patient        Pt observed in the milieu and pt mostly kept to self, listened to music on her headphone, and presented with flat and sad affect. Pt denied depression and stated that her sad look is always her look. Pt endorsed auditory hallucination and stated the voices are just whispering without further elaboration. Pt denied all other mental health psych symptoms and contracted for safety. Pt was visited by sister and visit went without incident but sister concern about her progress and will like an update from the provider tomorrow after assessment. Pt received prn hydroxyzine after her sister left anxiety and for sleep and went to bed.  Average food intake, adequate fluids intake, voiding freely and on scheduled miralax with BM today per pt. Denied pain and shortness of breath and vital sign stable. /70 (BP Location: Right arm, Patient Position: Sitting, Cuff Size: Adult Regular)   Pulse 56   Temp 98.1  F (36.7  C) (Oral)   Resp 16   Ht 1.727 m (5' 8\")   Wt 62.9 kg (138 lb 11.2 oz)   LMP 08/07/2024   SpO2 100%   BMI 21.09 kg/m               "

## 2024-10-15 NOTE — PLAN OF CARE
Problem: Adult Behavioral Health Plan of Care  Goal: Optimized Coping Skills in Response to Life Stressors  Outcome: Progressing     Problem: Adult Behavioral Health Plan of Care  Goal: Adheres to Safety Considerations for Self and Others  Intervention: Develop and Maintain Individualized Safety Plan  Recent Flowsheet Documentation  Taken 10/15/2024 1100 by Jonnie Joshi RN  Safety Measures: safety rounds completed  Goal: Absence of New-Onset Illness or Injury  Intervention: Identify and Manage Fall Risk  Recent Flowsheet Documentation  Taken 10/15/2024 1100 by Jonnie Joshi RN  Safety Measures: safety rounds completed  Goal: Optimized Coping Skills in Response to Life Stressors  Outcome: Progressing   Goal Outcome Evaluation:       Patient had flat affect. Mood was calm, anxious, and depressed. Was isolative to room and had minimal socialization with peers. Was guarded during assessment. Encouraged to participate in group activities. Verbalized anxiety 3/10, depression 4/10, and hearing voices telling her you are not good. Denied pain, covid 19 symptoms, SI/HI/SIB/VH, and contracted for safety. Was medication compliant and had good appetite. Will continue to monitor and assist patient.
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
## 2024-10-15 NOTE — PLAN OF CARE
Rehab Group    Start time: 1415  End time: 1500  Patient time total: 90 minutes    attended full group    #7 attended   Group Type: occupational therapy   Group Topic Covered: balanced lifestyle, cognitive activities, coping skills, healthy leisure time, problem solving, and social skills       Group Session Detail:  OT CLINIC     Patient Response/Contribution:  cooperative with task, organized, actively engaged, withdrawn, and did not share thoughts verbally       Patient Detail:    Occupational therapy clinic to facilitate coping skill exploration, creative expression within personally meaningful activities, and clinical observation of social, cognitive, and kinesthetic performance skills. Pt response: Pt presented with guarded body language/affect. Slow to initiate and gather materials for her creative expressive task. Demonstrated good focus though once engaged. Pt did not interact with peers throughout group process. Will continue to assess as attendance is established.         [id number] OT Group (2 or more in attendance)      Patient Active Problem List   Diagnosis    Juvenile idiopathic scoliosis, unspecified spinal region    Raynaud's phenomenon without gangrene    Psychosis (H)    Suicidal ideation    Hallucinations    Psychosis, atypical (H)

## 2024-10-15 NOTE — PLAN OF CARE
"Problem: Psychotic Symptoms  Goal: Psychotic Symptoms  Description: Signs and symptoms of listed problems will be absent or manageable.  Outcome: Progressing   Goal Outcome Evaluation:  /75 (BP Location: Right arm, Patient Position: Sitting, Cuff Size: Adult Regular)   Pulse 68   Temp 97  F (36.1  C) (Temporal)   Resp 16   Ht 1.727 m (5' 8\")   Wt 62.9 kg (138 lb 11.2 oz)   LMP 08/07/2024   SpO2 100%   BMI 21.09 kg/m      Pt presented with a flat and blunted affect. Mood was depressive.......reported that her mood was up and down.She endorsed depression of 3, denied all other mental health symptoms including anxiety, SI/SIB/HI and A/V hallucinations. Pt denied any physical pain. She isolated to her room laying on her bed the first part of the shift, she was intermittently out for needs. She was more out later in the evening, she paced the hallway with headphone on, briefly sat by the curved seating area in front of the nurse station. She attended group therapy. Took her bedtime medication including scheduled Miralax... reported last bm yesterday. No behavior concerns.  "

## 2024-10-16 PROCEDURE — 97150 GROUP THERAPEUTIC PROCEDURES: CPT | Mod: GO

## 2024-10-16 PROCEDURE — 128N000002 HC R&B CD/MH ADOLESCENT

## 2024-10-16 PROCEDURE — 99232 SBSQ HOSP IP/OBS MODERATE 35: CPT | Performed by: CLINICAL NURSE SPECIALIST

## 2024-10-16 PROCEDURE — 90832 PSYTX W PT 30 MINUTES: CPT

## 2024-10-16 PROCEDURE — 250N000013 HC RX MED GY IP 250 OP 250 PS 637: Performed by: NURSE PRACTITIONER

## 2024-10-16 PROCEDURE — 250N000013 HC RX MED GY IP 250 OP 250 PS 637: Performed by: CLINICAL NURSE SPECIALIST

## 2024-10-16 PROCEDURE — 250N000013 HC RX MED GY IP 250 OP 250 PS 637: Performed by: REGISTERED NURSE

## 2024-10-16 RX ADMIN — POLYETHYLENE GLYCOL 3350 17 G: 17 POWDER, FOR SOLUTION ORAL at 19:44

## 2024-10-16 RX ADMIN — HYDROXYZINE HYDROCHLORIDE 50 MG: 50 TABLET, FILM COATED ORAL at 20:27

## 2024-10-16 RX ADMIN — GABAPENTIN 300 MG: 300 CAPSULE ORAL at 19:43

## 2024-10-16 RX ADMIN — GABAPENTIN 100 MG: 100 CAPSULE ORAL at 08:45

## 2024-10-16 RX ADMIN — ARIPIPRAZOLE 10 MG: 10 TABLET ORAL at 08:45

## 2024-10-16 ASSESSMENT — ACTIVITIES OF DAILY LIVING (ADL)
ADLS_ACUITY_SCORE: 28
HYGIENE/GROOMING: INDEPENDENT
ADLS_ACUITY_SCORE: 28
ORAL_HYGIENE: INDEPENDENT
ADLS_ACUITY_SCORE: 28
DRESS: STREET CLOTHES
ADLS_ACUITY_SCORE: 28
LAUNDRY: WITH SUPERVISION
ADLS_ACUITY_SCORE: 28

## 2024-10-16 NOTE — PLAN OF CARE
Rehab Group    Start time: 1015  End time: 1105  Patient time total: 50 minutes    attended full group    #4 attended   Group Type: occupational therapy   Group Topic Covered: balanced lifestyle, cognitive activities, coping skills, healthy leisure time, problem solving, and social skills       Group Session Detail:  OT CLINIC     Patient Response/Contribution:  cooperative with task, organized, safe use of materials/supplies, and actively engaged       Patient Detail:    Occupational therapy clinic to facilitate coping skill exploration, creative expression within personally meaningful activities, and clinical observation of social, cognitive, and kinesthetic performance skills. Pt response: Pt presented with flat, blunted affect and calm mood. IND to initiate, gather materials, sequence, and adjust to workspace demands as needed for a creative expressive collage-based task. IND With all performance skills. Demonstrated good focus, planning, and organization for task. Appeared comfortable asking for assistance when needed. Limited social engagement observed with peers.         [id number] OT Group (2 or more in attendance)      Patient Active Problem List   Diagnosis    Juvenile idiopathic scoliosis, unspecified spinal region    Raynaud's phenomenon without gangrene    Psychosis (H)    Suicidal ideation    Hallucinations    Psychosis, atypical (H)

## 2024-10-16 NOTE — PROGRESS NOTES
"[facility], [place]   Psychiatric Progress Note        Interim History:   The patient's care was discussed with the treatment team during the daily team meeting and/or staff's chart notes were reviewed.  Staff report patient is cooperative with taking her medications.     Psychiatric symptoms and interventions:     Patient presented with a blunted affect. She was sitting on her desk top and rocking back and forth. Patient reports she interacts with \"this thing daily\". \"It talks to me. I try not to talk with it\". It tells me to do things like hurt myself\". Patient was calm. Patient said she wanted psych testing. \"I lied on my neuro psych testing in 2019 to get ASD dx.     Provider discussed medications with patient. No changes were made.     Patient slept 7 hours last night. Appetite is adequate.              Medications:     Current Facility-Administered Medications   Medication Dose Route Frequency Provider Last Rate Last Admin    ARIPiprazole (ABILIFY) tablet 10 mg  10 mg Oral Daily Robby Soriano APRN CNP   10 mg at 10/16/24 0845    gabapentin (NEURONTIN) capsule 100 mg  100 mg Oral QAM Robby Soriano APRN CNP   100 mg at 10/16/24 0845    gabapentin (NEURONTIN) capsule 300 mg  300 mg Oral At Bedtime Robby Soriano APRN CNP   300 mg at 10/15/24 1951    polyethylene glycol (MIRALAX) Packet 17 g  17 g Oral At Bedtime Sandra Mayers APRN CNP   17 g at 10/15/24 1950          Allergies:     Allergies   Allergen Reactions    Cats     Seasonal Allergies           Labs:   No results found for this or any previous visit (from the past 24 hour(s)).       Psychiatric Examination:     /75 (BP Location: Right arm)   Pulse 79   Temp (!) 96.7  F (35.9  C) (Temporal)   Resp 16   Ht 1.727 m (5' 8\")   Wt 62.9 kg (138 lb 11.2 oz)   LMP 08/07/2024   SpO2 99%   BMI 21.09 kg/m    Weight is 138 lbs 11.2 oz  Body mass index is 21.09 " kg/m .  Orthostatic Vitals       None          Appearance: awake, alert and well groomed  Attitude:  evasive and guarded  Eye Contact:  poor   Mood:  anxious and depressed  Affect:  mood congruent  Speech:  dysarthria  Psychomotor Behavior:  no evidence of tardive dyskinesia, dystonia, or tics  Throught Process:  linear  Associations:  no loose associations  Thought Content:  no evidence of suicidal ideation or homicidal ideation, auditory hallucinations present, and endorses urges to harm herself but is able to contract for safety  Insight:  limited  Judgement:  limited  Oriented to:  time, person, and place  Attention Span and Concentration:  fair  Recent and Remote Memory:  fair           Clinical Global Impressions  First:     Most recent:            Precautions:     Behavioral Orders   Procedures    Code 1 - Restrict to Unit    [email or web address]    MMPI 2    Routine Programming     As clinically indicated    Status 15     Every 15 minutes.          DIagnoses:   Unspecified mood disorder with psychosis versus schizophrenia spectrum disorder  Unspecified anxiety disorder  Autism spectrum disorder, by history  Eating disorder, by history  Trauma history, rule out PTSD  Rule out cluster B traits         Plan:         Legal status: Voluntary    Medication management:   -- Risperidone was discontinued in the emergency room  -- Abilify, 10 mg every morning for psychosis and mood stabilization  --Start gabapentin, 100 mg every morning for anxiety and 300 mg at bedtime for sleep  -- Additional medications include propranolol, Zyprexa, melatonin, hydroxyzine     Medical: Reviewed admit labs: unremarkable, UTOX negative .      Behavioral/psychology/social:   Encouraged patient to attend therapeutic hospital programming.   Patient would benefit from unit therapist   Patient will need to complete safety plan before discharge.   Psych testing   Precautions: suicide, SIB       Disposition:   Reason for continued hospitalization: patient  is psychotic and not safe for discharge.   Stabilize with medications, provide structured and supportive environment   Discharge meds: not ordered   Discharge plan: TBD.

## 2024-10-16 NOTE — PLAN OF CARE
"  Problem: Psychotic Symptoms  Goal: Social and Therapeutic (Psychotic Symptoms)  Description: Pt will remain safe on the unit every shift as evidenced by identify and utilize 3 coping skills, maintain ADL's independently and attend 50% of programming.  Outcome: Progressing   Goal Outcome Evaluation:     Mental Health:  Pt presents as flat affect, quiet, poor eye contact as pt frequently looks down, endorses depression 2/10 and anxiety 6/10,10 being the worst, thoughts are clear/intact, continues to have passive SI, pt hears voice that tells her to hurt self, \"screeches in, goes in and out, sometimes laughs at me,\" pt states she is able to distract self for the most part, \"I've been able to get by so far, right?\", pt contracts for safety and agrees to timely inform staff if not feel safe, med compliant-no observed or reported side effects, pt completes [place] test, and given MMPI3 test, answer sheet and small pencil to complete and turn into staff when done; pt encouraged to be visible in  milieu or lounge especially for meals as much as possible; pt observed brushing teeth on own, paces while listening to music, attends group otherwise withdrawn socially; cooperative and not aggressive.    Medical:  Pt denies any physical pain; eats about 25% or less of meals and sleeps 7 hours last night     Vitals:   /75 (BP Location: Right arm)   Pulse 79   Temp (!) 96.7  F (35.9  C) (Temporal)   Resp 16   Ht 1.727 m (5' 8\")   Wt 62.9 kg (138 lb 11.2 oz)   LMP 08/07/2024   SpO2 99%   BMI 21.09 kg/m         PRNs Given:  None    Continue to assess and monitor closely, and reassess for safety.                      "

## 2024-10-16 NOTE — PLAN OF CARE
Rehab Group    Start time: 1015  End time: 1110  Patient time total: 55 minutes    attended full group    #7 attended   Group Type: occupational therapy   Group Topic Covered: balanced lifestyle, cognitive activities, coping skills, healthy leisure time, problem solving, and social skills       Group Session Detail:  Cognitive Leisure Task     Patient Response/Contribution:  cooperative with task, organized, listened actively, and actively engaged       Patient Detail:    OT: Cognitive activity via leisure task for attention, sequencing, communication, new learning, retention, reality-orientation, social engagement, leisure exploration and coping with symptoms.  Pt Response: Pt presented with calm mood. Pt able to track the multi-step activity. Good language capability noted using memory and naming for creative responses. Pt appeared to enjoy the abstract leisure task but affect remained blunted and flat throughout.         [id number] OT Group (2 or more in attendance)      Patient Active Problem List   Diagnosis    Juvenile idiopathic scoliosis, unspecified spinal region    Raynaud's phenomenon without gangrene    Psychosis (H)    Suicidal ideation    Hallucinations    Psychosis, atypical (H)

## 2024-10-16 NOTE — PLAN OF CARE
"Problem: Psychotic Symptoms  Goal: Psychotic Symptoms  Description: Signs and symptoms of listed problems will be absent or manageable.  Outcome: Progressing   Goal Outcome Evaluation:  /75 (BP Location: Right arm, Patient Position: Sitting, Cuff Size: Adult Regular)   Pulse 74   Temp 97.9  F (36.6  C) (Tympanic)   Resp 16   Ht 1.727 m (5' 8\")   Wt 62.9 kg (138 lb 11.2 oz)   LMP 08/07/2024   SpO2 99%   BMI 21.09 kg/m      Pt was notably quiet, isolative and withdrawn to self. Mood was depressive with a flat and blunted affect. On approach, speech was somewhat pressured. When asked about psych symptoms, pt stated that she tries not to think about them. She reported anxiety of 6, and depression of 7, intermittent thoughts of hurting herself. She however reported being safe in the unit. Received prn hydroxyzine 50 mg offered for anxiety with partial relief. She paced the hallway with headphones on, watched TV with peers in brief intervals. Her appetite is fair, ate about 50% off her dinner tray. She was medication compliant. No new concerns.   "

## 2024-10-16 NOTE — PROGRESS NOTES
Rehab Group    Start time: 2030  End time: 2120  Patient time total: 50 minutes    attended full group    #7 attended   Group Type: recreation   Group Topic Covered: activity therapy, cognitive activities, and healthy leisure time       Group Session Detail:  TR leisure group     Patient Response/Contribution:  cooperative with task, attentive, and actively engaged       Patient Detail:    Pt participated in Therapeutic Recreation group with focus on leisure participation, communication skills, and critical thinking. Engaged and focused in the group recreational activity via a group game.  Pt was hesitant to take a turn, but was a full participant throughout the entire duration of group.  Pt appeared to brighten slightly with social interaction, but presented more reserved and flat throughout much of the group.      Activity Therapy Per 15 min ()      Patient Active Problem List   Diagnosis    Juvenile idiopathic scoliosis, unspecified spinal region    Raynaud's phenomenon without gangrene    Psychosis (H)    Suicidal ideation    Hallucinations    Psychosis, atypical (H)

## 2024-10-16 NOTE — PLAN OF CARE
"Team Note Due:  Monday  Assessment/Intervention/Current Symptoms and Care Coordination  Chart review and met with team, discussed pt progress, symptomology, and response to treatment.  Discussed the discharge plan and any potential impediments to discharge.    CTC received a call from [facility] inquiring about pt's readiness for interviewing. It was determined that [facility] will follow up next week.     CTC received email from [facility] inquiring about timeline for discharge. [facility] stated that pt should be ready next week and inquired what the timeline for admission. [facility] received the following response:     \"I most likely won't have anything next week but should have something early the week following (around Monday 10/28 or Tuesday 10/29). Do you want to touch base next week Monday to see how things are doing, and if it looks like scheduling might work out, we could schedule an interview for sometime next week?\"    [facility] called [facility] to inquire about referral. A VM was left.     CTC updated PT    CTC completed acuity rating.      Discharge Plan or Goal  Pending stabilization & development of a safe discharge plan.    Dispo Plan:Intensive Residential Treatment Services (IRTS):    Medications ordered/sent to:   Provisional Discharge required: NO    Barriers to Discharge   Patient requires further psychiatric stabilization due to current symptomology    Referral Status  Intensive Residential Treatment Services (IRTS):   [facility]: Sent 10/15  [facility]: sent 10/15; Follow up week of 10/21   [facility]: Sent 10/15  [facility]: Sent 10/15; Follow up week of 10/21  Legal Status  Patient is voluntary        Contacts:  MAHNAZ signed 10/12/24 Dulcejames Menjivar (sister) [phone]     MAHNAZ signed for Yaz Pimentel () [phone]; [email or web address]     [facility] I[facility]:  [phone]  [facility]: [email or web address]  [facility]: [phone];      Upcoming Meetings and " Dates/Important Information and next steps:  CTC will follow up on IRTS referrals for pt.   CTC will follow up with [facility] the week of 10/21  CTC will follow up with [facility] the week of 10/21

## 2024-10-16 NOTE — PLAN OF CARE
"Individual Therapy Note      Date of Service: October 16, 2024    Patient: Sparkle goes by \"Sparkle,\" uses she/her pronouns    Individuals Present: Sparkle Willard [facility]    Session start: 1330  Session end: 1350  Session duration in minutes: 20      Modality Used: Rapport Building, Motivational Interviewing, and Solution Focused    Goals: Complete safety plan    Patient Description of current symptoms: upset about conversation with sister     Mental Status Exam:   Attitude: guarded  Eye Contact: fair  Mood: sad  and depressed  Affect: mood congruent, intensity is blunted, and intensity is flat  Speech: clear, coherent  Psychomotor Behavior: no evidence of tardive dyskinesia, dystonia, or tics  Thought Process:  logical  Associations: no loose associations  Thought Content: no evidence of suicidal ideation or homicidal ideation  Insight: fair  Judgement: fair  Attention Span and Concentration: intact    Pt progress: Met patient to complete a safety plan. She was able to identify warning signs and coping skills. She has a very limited support system. She shared about a phone conversation with her sister last night that did not go well. She asked her sister if she could go back to her apartment to get some things before going to an IRTS and sister was upset and not obliging. Sister brought up her brain injury and blames the patient for that. Patient ws teary eyed while discussing the phone call with sister. We processed her feelings and will continue tomorrow due to time limitations today.    Treatment Objective(s) Addressed:   The focus of this session was on rapport building, orienting the patient to therapy, identifying and practicing coping strategies, processing feelings related to upset with sister, and safety planning     Progress Towards Goals and Assessment of Patient:   Patient is making progress towards treatment goals as evidenced by improving mood and engagement with treatment.       Therapeutic " Intervention(s):   Provided active listening, unconditional positive regard, and validation.   Engaged in safety planning identifying coping skills, warning signs, health support and resources      Plan/next step: Meet tomorrow to process upset feelings    [id number] - Psychotherapy (with patient) - 30 (16-37*) min    Patient Active Problem List   Diagnosis    Juvenile idiopathic scoliosis, unspecified spinal region    Raynaud's phenomenon without gangrene    Psychosis (H)    Suicidal ideation    Hallucinations    Psychosis, atypical (H)    :                         Oriented - self; Oriented - place; Oriented - time

## 2024-10-16 NOTE — PLAN OF CARE
"INITIAL OT ASSESSMENT     10/15/24 1500   General Information   Date Initially Attended OT 10/15/24   Clinical Impression   Affect Flat   Orientation Oriented to person, place and time   Appearance and ADLs General cleanliness observed in most areas   Attention to Internal Stimuli No observed signs   Interaction Skills Guarded   Ability to Communicate Needs Independent   Verbal Content Clear;Appropriate to topic;Superficial;Selective   Ability to Maintain Boundaries Maintains appropriate physical boundaries;Maintains appropriate verbal boundaries   Participation Participates with minimal encouragement   Concentration Concentrates 30+ minutes   Ability to Concentrate With structure   Follows and Comprehends Directions Independently follows multi-step directions   Memory Delayed and immediate recall intact   Organization Independently organizes all tasks   Decision Making Independent   Planning and Problem Solving Occasionally needs assist/feedback   Ability to Apply and Learn Concepts Comprehends concepts, but needs assist to apply   Frustrations / Stress Tolerance Independently identifies sources of frustration/stress;Independently identifies skills    Level of Insight Some insight   Self Esteem Poor self esteem   Social Supports Has knowledge of support systems   [facility] OT Care Plan Goals   OT Care Plan coping with symptoms     Patient Self-Assessment: Pt was given and completed a written self-assessment form. OT staff reviewed with pt and explained the value of having them involved in their treatment plan, and provided options to meet current needs/self-identified goals.     What do you do during the day/evening? \"Ordinarily full time at [employer], maybe go home and watch something then go to bed, most likely though to go to my sister's till the evening\"    What stressors do you face that trigger symptoms/substance use? \"Family, money, no plans, the future, the present\"    Who are supportive people you enjoy spending " "time with? \"My sister\"    What are your positive qualities? None stated    What helps you to calm down or relax? \"Distraction (visual/audio)    Coping Skills you'd like to explore in OT:     Guided Relaxation / Meditation    Physical Wellness / Exercise / Yoga / Anrdade Chi    Cooking / Baking (may not be an offered group)    Journaling / Coloring / Drawing   x Arts & Crafts    Group Games    Mental Health Management / Discussion    Others:      What are your goals for when you leave the hospital?     Engage in OT group activities that support recovery     Work on self-cares to improve wellness    Practice using coping strategies to manage stress and reduce symptoms    Participate in healthy, meaningful leisure activities    Share thoughts and/or feelings on mental health or substance use   x Improve focus and concentration during tasks   x Communicate needs effectively    Increase confidence and self-esteem    Other:      PLAN: Will provide structure, support, and encouragement. Offer education on coping strategies and life management skills. Assist pt to increase self awareness regarding mental health issues. Will assess further in the areas of organization, problem solving, and concentration.       "

## 2024-10-17 PROCEDURE — 99232 SBSQ HOSP IP/OBS MODERATE 35: CPT | Performed by: CLINICAL NURSE SPECIALIST

## 2024-10-17 PROCEDURE — 97150 GROUP THERAPEUTIC PROCEDURES: CPT | Mod: GO

## 2024-10-17 PROCEDURE — 250N000013 HC RX MED GY IP 250 OP 250 PS 637: Performed by: CLINICAL NURSE SPECIALIST

## 2024-10-17 PROCEDURE — 250N000013 HC RX MED GY IP 250 OP 250 PS 637: Performed by: NURSE PRACTITIONER

## 2024-10-17 PROCEDURE — 128N000002 HC R&B CD/MH ADOLESCENT

## 2024-10-17 PROCEDURE — 250N000013 HC RX MED GY IP 250 OP 250 PS 637: Performed by: PSYCHIATRY & NEUROLOGY

## 2024-10-17 RX ORDER — TRAZODONE HYDROCHLORIDE 50 MG/1
50 TABLET, FILM COATED ORAL AT BEDTIME
Status: DISCONTINUED | OUTPATIENT
Start: 2024-10-17 | End: 2024-10-21

## 2024-10-17 RX ADMIN — ACETAMINOPHEN 650 MG: 325 TABLET, FILM COATED ORAL at 09:10

## 2024-10-17 RX ADMIN — GABAPENTIN 300 MG: 300 CAPSULE ORAL at 19:31

## 2024-10-17 RX ADMIN — HYDROXYZINE HYDROCHLORIDE 50 MG: 50 TABLET, FILM COATED ORAL at 21:05

## 2024-10-17 RX ADMIN — ARIPIPRAZOLE 10 MG: 10 TABLET ORAL at 08:19

## 2024-10-17 RX ADMIN — TRAZODONE HYDROCHLORIDE 50 MG: 50 TABLET ORAL at 19:31

## 2024-10-17 RX ADMIN — GABAPENTIN 100 MG: 100 CAPSULE ORAL at 08:19

## 2024-10-17 ASSESSMENT — ACTIVITIES OF DAILY LIVING (ADL)
ADLS_ACUITY_SCORE: 28
HYGIENE/GROOMING: INDEPENDENT
ADLS_ACUITY_SCORE: 28
DRESS: STREET CLOTHES
ADLS_ACUITY_SCORE: 28
ORAL_HYGIENE: INDEPENDENT
ADLS_ACUITY_SCORE: 28
LAUNDRY: WITH SUPERVISION
ADLS_ACUITY_SCORE: 28
ADLS_ACUITY_SCORE: 28

## 2024-10-17 NOTE — PLAN OF CARE
Rehab Group    Start time: 1115  End time: 1200  Patient time total: 45 minutes    attended full group    #6 attended   Group Type: occupational therapy   Group Topic Covered: balanced lifestyle, cognitive activities, coping skills, healthy leisure time, problem solving, and social skills       Group Session Detail:  OT CLINIC     Patient Response/Contribution:  cooperative with task, organized, safe use of materials/supplies, and actively engaged       Patient Detail:    Occupational therapy clinic to facilitate coping skill exploration, creative expression within personally meaningful activities, and clinical observation of social, cognitive, and kinesthetic performance skills. Pt response: Pt presented with improved engagement task-wise and socially during this group. IND to initiate, gather materials, sequence, and adjust to workspace demands as needed for a familiar creative expressive collage-based task. IND With all performance skills. Demonstrated good focus, planning, and problem solving for selected task. Able to ask for assistance and appeared more comfortable interacting with peers and staff without being prompted to do so.      [id number] OT Group (2 or more in attendance)      Patient Active Problem List   Diagnosis    Juvenile idiopathic scoliosis, unspecified spinal region    Raynaud's phenomenon without gangrene    Psychosis (H)    Suicidal ideation    Hallucinations    Psychosis, atypical (H)

## 2024-10-17 NOTE — PLAN OF CARE
Team Note Due:  Monday  Assessment/Intervention/Current Symptoms and Care Coordination  Chart review and met with team, discussed pt progress, symptomology, and response to treatment.  Discussed the discharge plan and any potential impediments to discharge.     Writer met with pt in the lounge. She reported feeling tired and has had difficulty sleeping for the past 2 days, she agreed with provider to take Trazodone. Pt asked writer about IRTS referrals and writer informed her that [facility] will follow up next week, [facility] has openings the week of 10/28 and a VM was left for [facility].   Pt talked some about decisions to forfeit her housing to possibly go into supportive housing and apply for Social Security disability benefits, something her CM is working on. Pt had some worries about the limitations of SSDI, we talked about pros and cons. She mentioned she has some questions for her CM and would like to hear from her. Writer sent an email to CM to see if she can connect with pt.      Discharge Plan or Goal  Pending stabilization & development of a safe discharge plan.    Dispo Plan:Intensive Residential Treatment Services (IRTS):   Medications ordered/sent to:   Provisional Discharge required: NO    Barriers to Discharge   Patient requires further psychiatric stabilization due to current symptomology    Referral Status  Intensive Residential Treatment Services (IRTS):   [facility]: Sent 10/15  [facility]: sent 10/15; Follow up week of 10/21   [facility]: Sent 10/15  [facility]: Sent 10/15; Follow up week of 10/21  Legal Status  Patient is voluntary        Contacts:  MAHNAZ signed 10/12/24 Dulce Menjivar (sister) [phone]     MAHNAZ signed for Yaz Pimentel () [phone]; [email or web address]     [facility]:  [phone]  [facility]: [email or web address]  [facility]: [phone];      Upcoming Meetings and Dates/Important Information and next  steps:  CTC will follow up on IRTS referrals for pt.   CTC will follow up with [facility] the week of 10/21  CTC will follow up with [facility] the week of 10/21

## 2024-10-17 NOTE — PLAN OF CARE
"  Rehab Group    Start time: 1015  End time: 1115  Patient time total: 60 minutes    attended full group    #6 attended   Group Type: occupational therapy   Group Topic Covered: balanced lifestyle and coping skills       Group Session Detail:  Coping Skill Exploration     Patient Response/Contribution:  cooperative with task, organized, Limited eye contact, and did not share thoughts verbally       Patient Detail:    Mental health management group designed to promote insight, acceptance, and healthy stress management. Pt was instructed to create a 'do it instead' box and on the inside write down alternate/positive coping skills on slips of paper and place them in the box as a tool to use when having a negative urge or thought. Pt Response: Pt presented with limited eye contact. She completed the project with good focus and an organized approach. Pt did not engage in the group discussion, nor did she share verbally her intended use for her coping strategy box but she did say she \"have some ideas\" for how she may use it.         [id number] OT Group (2 or more in attendance)      Patient Active Problem List   Diagnosis    Juvenile idiopathic scoliosis, unspecified spinal region    Raynaud's phenomenon without gangrene    Psychosis (H)    Suicidal ideation    Hallucinations    Psychosis, atypical (H)       "

## 2024-10-17 NOTE — PROGRESS NOTES
[facility], [place]   Psychiatric Progress Note        Interim History:   The patient's care was discussed with the treatment team during the daily team meeting and/or staff's chart notes were reviewed.  Staff report patient is cooperative with taking her medications. Patient reports she likes being in the lounge better than groups.      Psychiatric symptoms and interventions:     Patient presented with a flat affect. She appeared distracted. Patient said she had poor sleep last night. Sleep report was 6.5 hours. Patient reports she is having difficulty falling asleep and staying asleep. Patient was agreeable to trazodone. She can take hydroxyzine along with trazodone.     Patient reports she is experiencing auditory hallucinations. Patient reports feeling overwhelmed and anxious. She denies suicidal thinking. She reports SIB urges are manageable. She has been safe on the unit.     Provider discussed medications with patient. Patient reports adequate appetite but poor sleep.     Patient completed written portion of psych testing.     Patient is agreeable to IRT's.          Medications:     Current Facility-Administered Medications   Medication Dose Route Frequency Provider Last Rate Last Admin    ARIPiprazole (ABILIFY) tablet 10 mg  10 mg Oral Daily Robby Soriano APRN CNP   10 mg at 10/17/24 0819    gabapentin (NEURONTIN) capsule 100 mg  100 mg Oral QAM Robby Soriano APRN CNP   100 mg at 10/17/24 0819    gabapentin (NEURONTIN) capsule 300 mg  300 mg Oral At Bedtime Robby Soriano APRN CNP   300 mg at 10/16/24 1943    polyethylene glycol (MIRALAX) Packet 17 g  17 g Oral At Bedtime Sandra Mayers APRN CNP   17 g at 10/16/24 1944    traZODone (DESYREL) tablet 50 mg  50 mg Oral At Bedtime Naegele, Debra Ann, APRN CNS              Allergies:     Allergies   Allergen Reactions    Cats     Seasonal Allergies           Labs:   No results  "found for this or any previous visit (from the past 24 hour(s)).       Psychiatric Examination:     /78   Pulse 69   Temp 97.6  F (36.4  C) (Temporal)   Resp 16   Ht 1.727 m (5' 8\")   Wt 62.9 kg (138 lb 11.2 oz)   LMP 08/07/2024   SpO2 100%   BMI 21.09 kg/m    Weight is 138 lbs 11.2 oz  Body mass index is 21.09 kg/m .  Orthostatic Vitals       None              Appearance: awake, alert and well groomed  Attitude:  evasive and guarded  Eye Contact:  poor   Mood:  anxious and depressed  Affect:  mood congruent  Speech:  dysarthria  Psychomotor Behavior:  no evidence of tardive dyskinesia, dystonia, or tics  Throught Process:  linear  Associations:  no loose associations  Thought Content:  no evidence of suicidal ideation or homicidal ideation, auditory hallucinations present, and endorses urges to harm herself but is able to contract for safety  Insight:  limited  Judgement:  limited  Oriented to:  time, person, and place  Attention Span and Concentration:  fair  Recent and Remote Memory:  fair       Clinical Global Impressions  First:     Most recent:            Precautions:     Behavioral Orders   Procedures    Code 1 - Restrict to Unit    LeadPages    MMPI 2    Routine Programming     As clinically indicated    Self Injury Precaution    Status 15     Every 15 minutes.    Suicide precautions: Suicide Risk: LOW; Clinical rationale to override score: modification to the care environment     Patients on Suicide Precautions should have a Combination Diet ordered that includes a Diet selection(s) AND a Behavioral Tray selection for Safe Tray - with utensils, or Safe Tray - NO utensils       Order Specific Question:   Suicide Risk     Answer:   LOW     Order Specific Question:   Clinical rationale to override score:     Answer:   modification to the care environment          DIagnoses:   Unspecified mood disorder with psychosis versus schizophrenia spectrum disorder  Unspecified anxiety disorder  Autism " spectrum disorder, by history  Eating disorder, by history  Trauma history, rule out PTSD  Rule out cluster B traits            Plan:   Legal status: Voluntary     Medication management:   -- Risperidone was discontinued in the emergency room  -- Abilify, 10 mg every morning for psychosis and mood stabilization  --Start gabapentin, 100 mg every morning for anxiety and 300 mg at bedtime for sleep  -- Additional medications include propranolol, Zyprexa, melatonin, hydroxyzine  -Started Trazodone 50 mg for sleep     Medical: Reviewed admit labs: unremarkable, UTOX negative .      Behavioral/psychology/social:   Encouraged patient to attend therapeutic hospital programming.   Patient would benefit from unit therapist   Patient will need to complete safety plan before discharge.   Psych testing -Dr. Matt will meet with her on Friday.   Precautions: suicide, SIB        Disposition:   Reason for continued hospitalization: patient is psychotic and not safe for discharge.   Stabilize with medications, provide structured and supportive environment   Discharge meds: not ordered   Discharge plan: IRT's referrals have been made.

## 2024-10-17 NOTE — PLAN OF CARE
"  Problem: Psychotic Symptoms  Goal: Social and Therapeutic (Psychotic Symptoms)  Description: Pt will remain safe on the unit every shift as evidenced by identify and utilize 3 coping skills, maintain ADL's independently and attend 50% of programming.  Outcome: Progressing   Goal Outcome Evaluation:    Plan of Care Reviewed With: patient           Mental Health:  Pt presents flat/blunt affect, calm and pleasant on approach with improved eye contact, med compliant; attends OT groups today and observed listening to music, pt walks and talks with writer during check in at 1330, pt reports mood as \"anxious and tired\" rates depression 4/10 and anxiety 7/10, 10 being worst; reports, \"the voice is less or softer,\" intermittent SI/SIB thoughts only-denies any specific thought, plan or intent; contracts and able to keep self safe; pt reports she has been out of her room \"more today,\" has questions about the length of time psych testing will take, reassurance given and all questions answered. Pt reminded of propanolol available for anxiety in addition to hydroxyzine as well as trazodone for sleep. Pt appears to be coping adequately to maintain safety with suspected improved trust/building rapport with Team    Medical:  Pt reports a \"slight headache\" and points to upper right side of her head, rates it a 5/10, 10 being worst, denies any fever, chills or GI upset; offered an ice pack, fluids and tylenol-pt accepts the tylenol and fluids declining ice pack. Pt appetite 25% and drinking water.     Vitals:   /78   Pulse 69   Temp 97.6  F (36.4  C) (Temporal)   Resp 16   Ht 1.727 m (5' 8\")   Wt 62.9 kg (138 lb 11.2 oz)   LMP 08/07/2024   SpO2 100%   BMI 21.09 kg/m         PRNs Given:  Tylenol 650 mg at 08:30 am, helpful for headache per pt report.    Continue to assess and monitor closely, 15 minute checks for safety.            "

## 2024-10-17 NOTE — PLAN OF CARE
"Goal Outcome Evaluation:      Problem: Depressive Symptoms  Goal: Depressive Symptoms  Description: Signs and symptoms of listed problems will be absent or manageable.  Outcome: Progressing      Pt was seen  in the milieu.  Pt was alert and oriented x 4,  cooperative with staff. VS stable. Affect is mood congruent.  Pt reports okay appetite, ate about 75% of dinner with adequate fluid. Per [facility] she  smelled emesis in the pt bathroom, writer went to pt room, no sign of purging food. When asked pt stated \" I don't that anymore, I used to do it before\"     Pt checked in as doing okay, rated anxiety at 7 and depression at 4  with 10 being worst. Pt rated overall mood anxious and depressed. Pt denies SI/SIB/HI. Denies visual and auditory hallucinations. PRN hydroxyzine 50 mg given at 2105 hr.     Pt was medication compliant, denied pain, medication side effects and medical concerns.      /78   Pulse 69   Temp 97.6  F (36.4  C) (Temporal)   Resp 16   Ht 1.727 m (5' 8\")   Wt 62.9 kg (138 lb 11.2 oz)   LMP 08/07/2024   SpO2 100%   BMI 21.09 kg/m        "

## 2024-10-17 NOTE — PLAN OF CARE
BEH IP Unit Acuity Rating Score (UARS)  Patient is given one point for every criteria they meet.    CRITERIA SCORING   On a 72 hour hold, court hold, committed, stay of commitment, or revocation. 0    Patient LOS on BEH unit exceeds 20 days. 0  LOS: 5   Patient under guardianship, 55+, otherwise medically complex, or under age 11. 0   Suicide ideation without relief of precipitating factors. 1   Current plan for suicide. 0   Current plan for homicide. 0   Imminent risk or actual attempt to seriously harm another without relief of factors precipitating the attempt. 0   Severe dysfunction in daily living (ex: complete neglect for self care, extreme disruption in vegetative function, extreme deterioration in social interactions). 1   Recent (last 7 days) or current physical aggression in the ED or on unit. 0   Restraints or seclusion episode in past 72 hours. 0   Recent (last 7 days) or current verbal aggression, agitation, yelling, etc., while in the ED or unit. 0   Active psychosis. 1   Need for constant or near constant redirection (from leaving, from others, etc).  0   Intrusive or disruptive behaviors. 0   Patient requires 3 or more hours of individualized nursing care per 8-hour shift (i.e. for ADLs, meds, therapeutic interventions). 0   TOTAL 3

## 2024-10-17 NOTE — PLAN OF CARE
Problem: Sleep Disturbance  Goal: Adequate Sleep/Rest  Outcome: Progressing   Goal Outcome Evaluation:    Patient slept for 6.5 hours during the night. Breathing noted even and unlabored. Safety precautions in progress with no occurrence. No behavior or safety concerns at this time. Will continue to monitor.

## 2024-10-18 PROCEDURE — 250N000013 HC RX MED GY IP 250 OP 250 PS 637: Performed by: CLINICAL NURSE SPECIALIST

## 2024-10-18 PROCEDURE — H2032 ACTIVITY THERAPY, PER 15 MIN: HCPCS

## 2024-10-18 PROCEDURE — 250N000013 HC RX MED GY IP 250 OP 250 PS 637: Performed by: NURSE PRACTITIONER

## 2024-10-18 PROCEDURE — 128N000002 HC R&B CD/MH ADOLESCENT

## 2024-10-18 PROCEDURE — 99232 SBSQ HOSP IP/OBS MODERATE 35: CPT | Performed by: CLINICAL NURSE SPECIALIST

## 2024-10-18 PROCEDURE — 250N000013 HC RX MED GY IP 250 OP 250 PS 637: Performed by: REGISTERED NURSE

## 2024-10-18 RX ADMIN — HYDROXYZINE HYDROCHLORIDE 50 MG: 50 TABLET, FILM COATED ORAL at 20:56

## 2024-10-18 RX ADMIN — GABAPENTIN 100 MG: 100 CAPSULE ORAL at 08:05

## 2024-10-18 RX ADMIN — GABAPENTIN 300 MG: 300 CAPSULE ORAL at 19:29

## 2024-10-18 RX ADMIN — ARIPIPRAZOLE 10 MG: 10 TABLET ORAL at 08:05

## 2024-10-18 RX ADMIN — TRAZODONE HYDROCHLORIDE 50 MG: 50 TABLET ORAL at 19:29

## 2024-10-18 RX ADMIN — POLYETHYLENE GLYCOL 3350 17 G: 17 POWDER, FOR SOLUTION ORAL at 19:29

## 2024-10-18 ASSESSMENT — ACTIVITIES OF DAILY LIVING (ADL)
ADLS_ACUITY_SCORE: 28

## 2024-10-18 NOTE — PLAN OF CARE
"  Problem: Psychotic Symptoms  Goal: Social and Therapeutic (Psychotic Symptoms)  Description: Pt will remain safe on the unit every shift as evidenced by identify and utilize 3 coping skills, maintain ADL's independently and attend 50% of programming.  Outcome: Progressing   Goal Outcome Evaluation:     Mental Health:  Pt presents pleasant on approach, improved eye contact, affect flat/blunt, endorses depression and anxiety, chronic SI/SIB thoughts-contracts and able to keep self safe; pt agrees that the voice is quieter, med compliant-no observed or reported side effects; pt meets with Dr Diaz for psych testing. Pt attends groups and continues to be more visible, cooperative with no unsafe behaviors.    Medical:  Pt denies any physical pain; appetite and sleep starting to improve.     Vitals:   /74 (Patient Position: Sitting, Cuff Size: Adult Regular)   Pulse 60   Temp 97.9  F (36.6  C) (Temporal)   Resp 16   Ht 1.727 m (5' 8\")   Wt 62.9 kg (138 lb 11.2 oz)   LMP 08/07/2024   SpO2 100%   BMI 21.09 kg/m         PRNs Given:  None    Continue to assess and monitor closely, suicide and self injury precautions for safety.                       "
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
## 2024-10-18 NOTE — PROGRESS NOTES
Rehab Group    Start time: 1315  End time: 1415  Patient time total: 60 minutes    attended full group    #4 attended   Group Type: art   Group Topic Covered: activity therapy       Group Session Detail:  Art Therapy directive was to create a mini collage in response to a chosen positive affirmation and/or personal intention.  Goals of directive: to identify personal strengths and goals, to express aspects of self, self-esteem, emotional expression, emotional regulation  Pts also finished collage projects from previous AT groups.        Patient Response/Contribution:  cooperative with task       Patient Detail:    Pt was a quiet, engaged participant, focused on task for the full duration of group. Pt finished two collages from a previous group. Pt chose not to share with group. Pts mood was depressed, flat/blunted affect.      Activity Therapy Per 15 min ()      Patient Active Problem List   Diagnosis    Juvenile idiopathic scoliosis, unspecified spinal region    Raynaud's phenomenon without gangrene    Psychosis (H)    Suicidal ideation    Hallucinations    Psychosis, atypical (H)

## 2024-10-18 NOTE — PROGRESS NOTES
Rehab Group    Start time: 1115  End time: 1215  Patient time total: 60 minutes    attended full group    #4 attended   Group Type: art   Group Topic Covered: activity therapy       Group Session Detail:  Art Therapy directive was to create a mini collage in response to a chosen positive affirmation and/or personal intention.  Goals of directive: to identify personal strengths and goals, to express aspects of self, self-esteem, emotional expression, emotional regulation        Patient Response/Contribution:  cooperative with task       Patient Detail:    Pt was an engaged participant, focused on task for the full duration of group. Pt finished collage and chose not to share/verbally process with group at this time. Pt shared with author that she does not care for positive affirmations and instead created a collage themed around pts relationship with her grandmother.  Pt did not share any additional details.  Pt was observed with a depressed mood, flat/blunted affect, minimal eye contact.      Activity Therapy Per 15 min ()      Patient Active Problem List   Diagnosis    Juvenile idiopathic scoliosis, unspecified spinal region    Raynaud's phenomenon without gangrene    Psychosis (H)    Suicidal ideation    Hallucinations    Psychosis, atypical (H)

## 2024-10-18 NOTE — PLAN OF CARE
Problem: Sleep Disturbance  Goal: Adequate Sleep/Rest  Outcome: Progressing   Goal Outcome Evaluation:    Patient slept for 6.75 hours last night. No indication of pain or discomfort. Safety precautions in progress with no occurrence. No behavior or safety concerns at this time. Will continue to monitor and provide support.

## 2024-10-18 NOTE — PLAN OF CARE
Team Note Due:  Monday  Assessment/Intervention/Current Symptoms and Care Coordination  Chart review and met with team, discussed pt progress, symptomology, and response to treatment.  Discussed the discharge plan and any potential impediments to discharge.       Discharge Plan or Goal  Pending stabilization & development of a safe discharge plan.    Dispo Plan:Intensive Residential Treatment Services (IRTS):   Medications ordered/sent to: TBD  Provisional Discharge required: NO    Barriers to Discharge   Patient requires further psychiatric stabilization due to current symptomology    Referral Status  Intensive Residential Treatment Services (IRTS):   [facility]: Sent 10/15  [facility]: sent 10/15; Follow up week of 10/21   [facility]: Sent 10/15  [facility]: Sent 10/15; Follow up week of 10/21  Legal Status  Patient is voluntary        Contacts:  MAHNAZ signed 10/12/24 Dulce Tiara (sister) [phone]     MAHNAZ signed for Yaz Pimentel () [phone]; [email or web address]     [facility]:  [phone]  [facility]: [email or web address]  [facility]: [phone];      Upcoming Meetings and Dates/Important Information and next steps:    10/18 coverage note: primary ctc notes that [facility] will follow up with IRTS referrals next week. See below.     [facility] will follow up on IRTS referrals for pt.   [facility] will [facility] with [facility] the week of 10/21  [facility] will follow up with [facility] the week of 10/21

## 2024-10-18 NOTE — PROGRESS NOTES
Rehab Group    Start time: 1015  End time: 1115  Patient time total: 60 minutes    attended full group    #4 attended   Group Type: art   Group Topic Covered: activity therapy       Group Session Detail:  Art Therapy directive was to create a mini collage in response to a chosen positive affirmation and/or personal intention.  Goals of directive: to identify personal strengths and goals, to express aspects of self, self-esteem, emotional expression, emotional regulation        Patient Response/Contribution:  cooperative with task       Patient Detail:    Pt was an engaged participant, focused on task for the full duration of group. Pt continued to work on collage in the next AT group (see note).  Pt was a quiet participant, mood was depressed. Pt was observed with flat, blunted affect, slouched body posture, minimal eye contact.      Activity Therapy Per 15 min ()      Patient Active Problem List   Diagnosis    Juvenile idiopathic scoliosis, unspecified spinal region    Raynaud's phenomenon without gangrene    Psychosis (H)    Suicidal ideation    Hallucinations    Psychosis, atypical (H)

## 2024-10-18 NOTE — PLAN OF CARE
"Goal Outcome Evaluation:    Problem: Suicide Risk  Goal: Absence of Self-Harm  Outcome: Progressing       Pt was met in her room  Flat/blunted affect   No eye contact, quiet and soft spoken  Isolative to room and withdrawn.  Guarded though cooperative     Pt checked in as doing okay  Pt stated not being able to concentrate, sort of like a \"foggy brain\"  Pt denies SI/SIB, anxiety   Pt endorses some AH- \"in the background, whispering\"  Pt contracts for safety on the unit. Pt watched some movie or walked in the hallway     PRN hydroxyzine given per request   Pt was medication compliant  Pt denied any pain or medical concern  No unsafe behavior noted       "

## 2024-10-18 NOTE — PLAN OF CARE
Group Attendance:  attended partial group    Time session began: 2000   Time session ended: 2045  Patient's total time in group: 15    Total # Attendees   4   Group Type psychoeducational and DBT     Group Topic Covered emotional regulation, insight improvement, healthy coping skills, DBT skills: IMPROVE, mindfulness, and relaxation and grounding techniques/coping skills     Group Session Detail Group members checked in before the activity. Group members discussed the DBT acronym IMPROVE, discussing them meaning of each and putting them into practice.      Patient's response to the group topic/interactions:  cooperative with task, listened actively, quiet     Patient Details: Pt attended group, listened the the discussion, was mostly quiet, but did start to smile along with the group discussion toward the end of group.           No charge- less than 15 minutes  Patient Active Problem List   Diagnosis    Juvenile idiopathic scoliosis, unspecified spinal region    Raynaud's phenomenon without gangrene    Psychosis (H)    Suicidal ideation    Hallucinations    Psychosis, atypical (H)

## 2024-10-18 NOTE — PROGRESS NOTES
[facility], [place]   Psychiatric Progress Note            Interim History:   The patient's care was discussed with the treatment team during the daily team meeting and/or staff's chart notes were reviewed.  Staff report patient has been cooperative with taking her medications.     Psychiatric symptoms and interventions:     Patient presented as very guarded and withdrawn. Patient said she was getting bored and wanted to start interviewing for an IRTs. Patient looks at the floor, She spoke in a very soft tone. Affect was blunted. Patient continues to endorse auditory hallucinations. She denies SI or SIB uregs. She has been safe on the unit.     Patient reports she met with Dr. Matt. She reports psych testing went well. She liked that it was shorter than her neuro psych eval. Results pending    Patient reports better sleep last night with medication adjustment. Appetite is adequate.     Patient is agreeable to IRT's.          Medications:     Current Facility-Administered Medications   Medication Dose Route Frequency Provider Last Rate Last Admin    ARIPiprazole (ABILIFY) tablet 10 mg  10 mg Oral Daily Robby Soriano APRN CNP   10 mg at 10/18/24 0805    gabapentin (NEURONTIN) capsule 100 mg  100 mg Oral QAM Robby Soriano APRN CNP   100 mg at 10/18/24 0805    gabapentin (NEURONTIN) capsule 300 mg  300 mg Oral At Bedtime Robby Soriano APRN CNP   300 mg at 10/17/24 1931    polyethylene glycol (MIRALAX) Packet 17 g  17 g Oral At Bedtime Sandra Mayers APRN CNP   17 g at 10/16/24 1944    traZODone (DESYREL) tablet 50 mg  50 mg Oral At Bedtime Naegele, Debra Ann, APRN CNS   50 mg at 10/17/24 1931          Allergies:     Allergies   Allergen Reactions    Cats     Seasonal Allergies           Labs:   No results found for this or any previous visit (from the past 24 hour(s)).       Psychiatric Examination:     /74 (Patient Position:  "Sitting, Cuff Size: Adult Regular)   Pulse 60   Temp 97.9  F (36.6  C) (Temporal)   Resp 16   Ht 1.727 m (5' 8\")   Wt 62.9 kg (138 lb 11.2 oz)   LMP 08/07/2024   SpO2 100%   BMI 21.09 kg/m    Weight is 138 lbs 11.2 oz  Body mass index is 21.09 kg/m .  Orthostatic Vitals       None              Appearance: awake, alert and well groomed  Attitude:  evasive and guarded  Eye Contact:  poor   Mood:  anxious and depressed  Affect:  mood congruent  Speech:  very soft tone  Psychomotor Behavior:  no evidence of tardive dyskinesia, dystonia, or tics  Throught Process:  linear  Associations:  no loose associations  Thought Content:  no evidence of suicidal ideation or homicidal ideation, auditory hallucinations present, and endorses urges to harm herself but is able to contract for safety  Insight:  limited  Judgement:  limited  Oriented to:  time, person, and place  Attention Span and Concentration:  fair  Recent and Remote Memory:  fair    Clinical Global Impressions  First:     Most recent:            Precautions:     Behavioral Orders   Procedures    Code 1 - Restrict to Unit    [employer]    MMPI 2    Routine Programming     As clinically indicated    Self Injury Precaution    Status 15     Every 15 minutes.    Suicide precautions: Suicide Risk: LOW; Clinical rationale to override score: modification to the care environment     Patients on Suicide Precautions should have a Combination Diet ordered that includes a Diet selection(s) AND a Behavioral Tray selection for Safe Tray - with utensils, or Safe Tray - NO utensils       Order Specific Question:   Suicide Risk     Answer:   LOW     Order Specific Question:   Clinical rationale to override score:     Answer:   modification to the care environment          DIagnoses:   Unspecified mood disorder with psychosis versus schizophrenia spectrum disorder  Unspecified anxiety disorder  Autism spectrum disorder, by history  Eating disorder, by history  Trauma history, rule " out PTSD  Rule out cluster B traits            Plan:   Legal status: Voluntary     Medication management:   -- Risperidone was discontinued in the emergency room  -- Abilify, 10 mg every morning for psychosis and mood stabilization  --Start gabapentin, 100 mg every morning for anxiety and 300 mg at bedtime for sleep  -- Additional medications include propranolol, Zyprexa, melatonin, hydroxyzine  -Started Trazodone 50 mg for sleep     Medical: Reviewed admit labs: unremarkable, UTOX negative .      Behavioral/psychology/social:   Encouraged patient to attend therapeutic hospital programming.   Patient would benefit from unit therapist   Patient will need to complete safety plan before discharge.   Psych testing -Dr. Matt met with her on Friday.   Precautions: suicide, SIB        Disposition:   Reason for continued hospitalization: patient is psychotic and not safe for discharge.   Stabilize with medications, provide structured and supportive environment   Discharge meds: not ordered   Discharge plan: IRT's referrals have been made.

## 2024-10-19 PROCEDURE — 250N000013 HC RX MED GY IP 250 OP 250 PS 637: Performed by: NURSE PRACTITIONER

## 2024-10-19 PROCEDURE — 250N000013 HC RX MED GY IP 250 OP 250 PS 637: Performed by: PSYCHIATRY & NEUROLOGY

## 2024-10-19 PROCEDURE — 128N000002 HC R&B CD/MH ADOLESCENT

## 2024-10-19 PROCEDURE — H2032 ACTIVITY THERAPY, PER 15 MIN: HCPCS

## 2024-10-19 PROCEDURE — 250N000013 HC RX MED GY IP 250 OP 250 PS 637: Performed by: CLINICAL NURSE SPECIALIST

## 2024-10-19 RX ADMIN — GABAPENTIN 100 MG: 100 CAPSULE ORAL at 08:15

## 2024-10-19 RX ADMIN — Medication 3 MG: at 21:05

## 2024-10-19 RX ADMIN — GABAPENTIN 300 MG: 300 CAPSULE ORAL at 19:56

## 2024-10-19 RX ADMIN — TRAZODONE HYDROCHLORIDE 50 MG: 50 TABLET ORAL at 19:56

## 2024-10-19 RX ADMIN — ARIPIPRAZOLE 10 MG: 10 TABLET ORAL at 08:15

## 2024-10-19 ASSESSMENT — ACTIVITIES OF DAILY LIVING (ADL)
ADLS_ACUITY_SCORE: 28
ORAL_HYGIENE: INDEPENDENT
ADLS_ACUITY_SCORE: 28
HYGIENE/GROOMING: INDEPENDENT
ADLS_ACUITY_SCORE: 28

## 2024-10-19 NOTE — PLAN OF CARE
Problem: Suicide Risk  Goal: Absence of Self-Harm  Outcome: Progressing   Goal Outcome Evaluation:     Plan of Care Reviewed With: patient       Pt med compliant, claims no pain. She endorsed anxiety 5/10, depression 6/10, and auditory hallucinations of voices that she cannot distinguish words. Pt denies SI, HI, and visual hallucinations. Pt ate 50% of breakfast, colored  in the lounge, paced the hallway,and read a book at the end of the hallway this morning. She ate 90% of lunch, paced the almanza, read, and slept in her bed.

## 2024-10-19 NOTE — PLAN OF CARE
Patient was asleep for 7hrs during the night shift. Patient did not have any behavioral or medical concerns and remain on 15min checks. RN will continue to monitor.

## 2024-10-19 NOTE — PLAN OF CARE
Problem: Suicide Risk  Goal: Absence of Self-Harm  Outcome: Progressing  Intervention: Assess Risk to Self and Maintain Safety  Flowsheets (Taken 10/19/2024 7057)  Behavior Management:   impulse control promoted   security enhancements provided  Self-Harm Prevention: environment modified for self-harm risk   Goal Outcome Evaluation:    Plan of Care Reviewed With: patient        Patient is visible in the milieu, likes to reading, social with selected peers, soft spoken, denies SI,SIB,reports hearing inaudible voices, rated anxiety and depression 5/10, contracted for safety in the unit.  Patient had good appetite.  Participated in groups.  Patient remained calm and cooperative.  Patient took scheduled meds, except scheduled miralax requested prn melatonin for sleep.    Will continue to monitor.

## 2024-10-20 PROCEDURE — 250N000013 HC RX MED GY IP 250 OP 250 PS 637: Performed by: NURSE PRACTITIONER

## 2024-10-20 PROCEDURE — 128N000002 HC R&B CD/MH ADOLESCENT

## 2024-10-20 PROCEDURE — H2032 ACTIVITY THERAPY, PER 15 MIN: HCPCS

## 2024-10-20 PROCEDURE — 250N000013 HC RX MED GY IP 250 OP 250 PS 637: Performed by: CLINICAL NURSE SPECIALIST

## 2024-10-20 PROCEDURE — 250N000013 HC RX MED GY IP 250 OP 250 PS 637: Performed by: PSYCHIATRY & NEUROLOGY

## 2024-10-20 PROCEDURE — 250N000013 HC RX MED GY IP 250 OP 250 PS 637: Performed by: REGISTERED NURSE

## 2024-10-20 RX ADMIN — HYDROXYZINE HYDROCHLORIDE 50 MG: 50 TABLET, FILM COATED ORAL at 12:54

## 2024-10-20 RX ADMIN — TRAZODONE HYDROCHLORIDE 50 MG: 50 TABLET ORAL at 21:27

## 2024-10-20 RX ADMIN — TRAZODONE HYDROCHLORIDE 50 MG: 50 TABLET ORAL at 20:27

## 2024-10-20 RX ADMIN — GABAPENTIN 300 MG: 300 CAPSULE ORAL at 20:27

## 2024-10-20 RX ADMIN — POLYETHYLENE GLYCOL 3350 17 G: 17 POWDER, FOR SOLUTION ORAL at 20:27

## 2024-10-20 RX ADMIN — Medication 3 MG: at 22:02

## 2024-10-20 RX ADMIN — GABAPENTIN 100 MG: 100 CAPSULE ORAL at 08:19

## 2024-10-20 RX ADMIN — ARIPIPRAZOLE 10 MG: 10 TABLET ORAL at 08:18

## 2024-10-20 ASSESSMENT — ACTIVITIES OF DAILY LIVING (ADL)
ADLS_ACUITY_SCORE: 28
HYGIENE/GROOMING: INDEPENDENT
ADLS_ACUITY_SCORE: 28
DRESS: SCRUBS (BEHAVIORAL HEALTH)
ADLS_ACUITY_SCORE: 28
ORAL_HYGIENE: INDEPENDENT
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
## 2024-10-20 NOTE — PLAN OF CARE
Goal Outcome Evaluation:    Plan of Care Reviewed With: patient        Problem: Adult Behavioral Health Plan of Care  Goal: Adheres to Safety Considerations for Self and Others  Outcome: Progressing     Pt rated her anxiety level at 5/10, and depression at 3/10. She took hydroxyzine 50 mg prn. Pt is requesting to have order for 25 to 50 mg so that she could have a lower dose at times as needed. She said that 50 mg makes her sleep. Pt at 100% of her lunch, and fluid intake was good.

## 2024-10-20 NOTE — PLAN OF CARE
Patient was asleep for 6.5hrs during the night shift. Patient did not have any behavioral or medical concerns and remain on 15min checks. RN will continue to monitor.

## 2024-10-20 NOTE — PLAN OF CARE
Problem: Psychotic Symptoms  Goal: Psychotic Symptoms  Description: Signs and symptoms of listed problems will be absent or manageable.  Outcome: Progressing  Flowsheets (Taken 10/20/2024 1615)  Psychotic Symptoms Present:   affect   mood   anxiety   thought process     Problem: Psychotic Symptoms  Intervention: Psychotic Symptoms  Flowsheets (Taken 10/20/2024 1615)  Psychotic Symptoms:   maintain safety precautions   maintain safe secure environment   decrease environmental stimulation   simple, clear language   establish therapeutic relationship   encourage nutrition and hydration   encourage participation / independence with adls   Goal Outcome Evaluation:    Plan of Care Reviewed With: patient        Patient is visible in the milieu, likes reading books in her room, social with selected peers, denies SI,SIB, reports hearing inaudible voices, rated anxiety 4/10 and depression 3/10, contracted for safety in the unit.   Patient ate 75% of dinner.  Patient took scheduled medications.  Patient requested for a repeat dose of trazodone 50mg at bedtime and took prn melatonin.    Will continue to monitor.

## 2024-10-21 PROCEDURE — H2032 ACTIVITY THERAPY, PER 15 MIN: HCPCS

## 2024-10-21 PROCEDURE — 99232 SBSQ HOSP IP/OBS MODERATE 35: CPT | Performed by: NURSE PRACTITIONER

## 2024-10-21 PROCEDURE — 250N000013 HC RX MED GY IP 250 OP 250 PS 637: Performed by: NURSE PRACTITIONER

## 2024-10-21 PROCEDURE — 128N000002 HC R&B CD/MH ADOLESCENT

## 2024-10-21 RX ORDER — TRAZODONE HYDROCHLORIDE 100 MG/1
100 TABLET ORAL AT BEDTIME
Status: DISCONTINUED | OUTPATIENT
Start: 2024-10-21 | End: 2024-10-31 | Stop reason: HOSPADM

## 2024-10-21 RX ORDER — HYDROXYZINE HYDROCHLORIDE 25 MG/1
25 TABLET, FILM COATED ORAL 3 TIMES DAILY PRN
Status: DISCONTINUED | OUTPATIENT
Start: 2024-10-21 | End: 2024-10-31 | Stop reason: HOSPADM

## 2024-10-21 RX ADMIN — GABAPENTIN 300 MG: 300 CAPSULE ORAL at 20:00

## 2024-10-21 RX ADMIN — TRAZODONE HYDROCHLORIDE 100 MG: 100 TABLET ORAL at 20:00

## 2024-10-21 RX ADMIN — GABAPENTIN 100 MG: 100 CAPSULE ORAL at 08:11

## 2024-10-21 RX ADMIN — ARIPIPRAZOLE 10 MG: 10 TABLET ORAL at 08:12

## 2024-10-21 ASSESSMENT — ACTIVITIES OF DAILY LIVING (ADL)
ADLS_ACUITY_SCORE: 28
HYGIENE/GROOMING: INDEPENDENT
ADLS_ACUITY_SCORE: 28
ORAL_HYGIENE: INDEPENDENT
ADLS_ACUITY_SCORE: 28
DRESS: SCRUBS (BEHAVIORAL HEALTH)
ADLS_ACUITY_SCORE: 28
ADLS_ACUITY_SCORE: 28

## 2024-10-21 NOTE — PLAN OF CARE
The patient slept for 6.75hrs during the shift. Patient did not have any behavioral or medical concerns and remain on 15min checks. RN will continue to monitor.

## 2024-10-21 NOTE — PLAN OF CARE
"  Problem: Suicide Risk  Goal: Absence of Self-Harm  Outcome: Progressing   Goal Outcome Evaluation:     Plan of Care Reviewed With: patient       Pt not sleeping well- says she Pt med compliant, claims no pain. She denies anxiety, depression, SI, HI, and hallucinations. Pt ate breakfast, paced the hallway, and read  in the lounge. Later in the morning she was some anxiety- hearing subdued voices, feeling that someone was watching her from behind. She wanted results of her MMPI and [place] tests- shared information, pt informed she can get a copy from medical records when she discharges.      Pt ate lunch, paced the almanza, read a book in her room, her sister called and pt spoke with her. Pt spent afternoon in her room. Her sister thinks pt is a harm to self and others- she no longer wants to \"take care\" of pt.    Pt's prn hydroxyzine dose was decreased to 25 mg tid. Bedtime trazodone increased to 100 mg. Morning Abilify was increased to 15 mg.           "

## 2024-10-21 NOTE — PROGRESS NOTES
"[facility], [place]   Psychiatric Progress Note        Interim History:   Team meeting report: The patient's care was discussed with the treatment team during the daily team meeting and/or staff's chart notes were reviewed.  Nursing staff reports the patient has been calm, pleasant, cooperative.  She is visible in the milieu and social with peers.  Denies suicidal and homicidal ideation.  Reports auditory hallucinations mostly inaudible.  Denied visual hallucinations and paranoia.  Have not been making any bizarre statements.  Reported minimal to moderate depression and anxiety.  Ate well.  Med compliant.  Attended groups.  Slept through the night.    Met with patient.  States the weekend was \"long\".  Reported anxiety about being in the hospital.  She would like to discharge soon as possible.  Depression is minimal.  Sleep have been difficult.  She is able to fall asleep but not stay asleep.  She has been waking up every 2 hours.  The patient is interested in increasing the dose of the trazodone.  We also discussed that taking Zyprexa as needed if all fails.  The patient reports auditory hallucinations of a single voice that changes depending on what is going on.  Currently the voice is very quiet.  She is reporting visual hallucinations or rather a feeling that there is somebody behind her at all times.  Denies paranoia.  Discussed increasing the dose of the Abilify and she was agreeable.  Discussed side effects per her request.  Discussed the results of the psychological testing.  The patient would like to have a copy of discharge.     Discussed disposition.  The patient feels ready.  She knows that we are waiting for a bed at [facility].  No other questions or concerns.           Medications:     Current Facility-Administered Medications   Medication Dose Route Frequency Provider Last Rate Last Admin    ARIPiprazole (ABILIFY) tablet 10 mg  10 mg Oral Daily Robby Soriano APRN " CNP   10 mg at 10/21/24 0812    gabapentin (NEURONTIN) capsule 100 mg  100 mg Oral QAM Robby Soriano, APRN CNP   100 mg at 10/21/24 0811    gabapentin (NEURONTIN) capsule 300 mg  300 mg Oral At Bedtime Robby Soriano, APRN CNP   300 mg at 10/20/24 2027    polyethylene glycol (MIRALAX) Packet 17 g  17 g Oral At Bedtime Sandra Mayers, APRN CNP   17 g at 10/20/24 2027    traZODone (DESYREL) tablet 50 mg  50 mg Oral At Bedtime KathygraceJavia Keily, APRN CNS   50 mg at 10/20/24 2127            Allergies:     Allergies   Allergen Reactions    Cats     Seasonal Allergies             Labs:     Recent Results (from the past 672 hour(s))   HCG qualitative urine    Collection Time: 10/11/24  9:22 AM   Result Value Ref Range    hCG Urine Qualitative Negative Negative   Urine Drug Screen Panel    Collection Time: 10/11/24  9:22 AM   Result Value Ref Range    Amphetamines Urine Screen Negative Screen Negative    Barbituates Urine Screen Negative Screen Negative    Benzodiazepine Urine Screen Negative Screen Negative    Cannabinoids Urine Screen Negative Screen Negative    Cocaine Urine Screen Negative Screen Negative    Fentanyl Qual Urine Screen Negative Screen Negative    Opiates Urine Screen Negative Screen Negative    PCP Urine Screen Negative Screen Negative   Asymptomatic COVID-19 Virus (Coronavirus) by PCR Nose    Collection Time: 10/11/24  6:16 PM    Specimen: Nose; Swab   Result Value Ref Range    SARS CoV2 PCR Negative Negative   EKG 12-lead, tracing only    Collection Time: 10/12/24 10:32 AM   Result Value Ref Range    Systolic Blood Pressure  mmHg    Diastolic Blood Pressure  mmHg    Ventricular Rate 57 BPM    Atrial Rate 57 BPM    HI Interval 144 ms    QRS Duration 94 ms     ms    QTc 399 ms    P Axis 32 degrees    R AXIS 79 degrees    T Axis 37 degrees    Interpretation ECG       Sinus bradycardia  Otherwise normal ECG  No previous ECGs available  Confirmed by MD VIVIAN,  LANETTE ([id number]) on 10/13/2024 10:23:47 AM     Vitamin D Deficiency    Collection Time: 10/12/24 11:21 AM   Result Value Ref Range    Vitamin D, Total (25-Hydroxy) 27 20 - 50 ng/mL   Hemoglobin A1c    Collection Time: 10/12/24 11:21 AM   Result Value Ref Range    Estimated Average Glucose 80 <117 mg/dL    Hemoglobin A1C 4.4 <5.7 %   Folate    Collection Time: 10/12/24 11:21 AM   Result Value Ref Range    Folic Acid 11.6 4.6 - 34.8 ng/mL   Vitamin B12    Collection Time: 10/12/24 11:21 AM   Result Value Ref Range    Vitamin B12 414 232 - 1,245 pg/mL   Comprehensive metabolic panel    Collection Time: 10/12/24 11:21 AM   Result Value Ref Range    Sodium 139 135 - 145 mmol/L    Potassium 4.1 3.4 - 5.3 mmol/L    Carbon Dioxide (CO2) 23 22 - 29 mmol/L    Anion Gap 11 7 - 15 mmol/L    Urea Nitrogen 8.2 6.0 - 20.0 mg/dL    Creatinine 0.68 0.51 - 0.95 mg/dL    GFR Estimate >90 >60 mL/min/1.73m2    Calcium 9.5 8.8 - 10.4 mg/dL    Chloride 105 98 - 107 mmol/L    Glucose 88 70 - 99 mg/dL    Alkaline Phosphatase 43 40 - 150 U/L    AST 20 0 - 45 U/L    ALT 6 0 - 50 U/L    Protein Total 7.0 6.4 - 8.3 g/dL    Albumin 4.3 3.5 - 5.2 g/dL    Bilirubin Total 0.6 <=1.2 mg/dL   CBC with platelets and differential    Collection Time: 10/12/24 11:21 AM   Result Value Ref Range    WBC Count 6.1 4.0 - 11.0 10e3/uL    RBC Count 4.03 3.80 - 5.20 10e6/uL    Hemoglobin 12.2 11.7 - 15.7 g/dL    Hematocrit 37.2 35.0 - 47.0 %    MCV 92 78 - 100 fL    MCH 30.3 26.5 - 33.0 pg    MCHC 32.8 31.5 - 36.5 g/dL    RDW 12.3 10.0 - 15.0 %    Platelet Count 216 150 - 450 10e3/uL    % Neutrophils 62 %    % Lymphocytes 30 %    % Monocytes 6 %    % Eosinophils 1 %    % Basophils 1 %    % Immature Granulocytes 0 %    NRBCs per 100 WBC 0 <1 /100    Absolute Neutrophils 3.8 1.6 - 8.3 10e3/uL    Absolute Lymphocytes 1.8 0.8 - 5.3 10e3/uL    Absolute Monocytes 0.4 0.0 - 1.3 10e3/uL    Absolute Eosinophils 0.1 0.0 - 0.7 10e3/uL    Absolute Basophils 0.1 0.0 - 0.2 10e3/uL     Absolute Immature Granulocytes 0.0 <=0.4 10e3/uL    Absolute NRBCs 0.0 10e3/uL   Lipid panel reflex to direct LDL    Collection Time: 10/14/24  7:59 AM   Result Value Ref Range    Cholesterol 136 <200 mg/dL    Triglycerides 50 <150 mg/dL    Direct Measure HDL 57 >=50 mg/dL    LDL Cholesterol Calculated 69 <100 mg/dL    Non HDL Cholesterol 79 <130 mg/dL            Precautions:     Behavioral Orders   Procedures    Code 1 - Restrict to Unit    [employer]    MMPI 2    Routine Programming     As clinically indicated    Self Injury Precaution    Status 15     Every 15 minutes.    Suicide precautions: Suicide Risk: LOW; Clinical rationale to override score: modification to the care environment     Patients on Suicide Precautions should have a Combination Diet ordered that includes a Diet selection(s) AND a Behavioral Tray selection for Safe Tray - with utensils, or Safe Tray - NO utensils       Order Specific Question:   Suicide Risk     Answer:   LOW     Order Specific Question:   Clinical rationale to override score:     Answer:   modification to the care environment            Psychiatric Examination:   Temp: 97.7  F (36.5  C) (AM vitals)   BP: 104/69 Pulse: 72   Resp: 18 SpO2: 100 % O2 Device: None (Room air)    Weight is 138 lbs 11.2 oz  Body mass index is 21.09 kg/m .    Appearance: awake, alert and adequately groomed  Attitude:  cooperative  Eye Contact:  fair  Mood:  anxious, depressed, and better  Affect:  mood congruent  Speech:  dysarthria  Psychomotor Behavior:  no evidence of tardive dyskinesia, dystonia, or tics  Throught Process:  logical and goal oriented  Associations:  no loose associations  Thought Content:  no evidence of suicidal ideation or homicidal ideation and reports auditory hallucination and a feeling that somebody is behind her at all times.  Insight:  fair  Judgement:  fair  Oriented to:  time, person, and place  Attention Span and Concentration:  intact  Recent and Remote Memory:   intact           Precautions:     Behavioral Orders   Procedures    Code 1 - Restrict to Unit    [facility]    MMPI 2    Routine Programming     As clinically indicated    Self Injury Precaution    Status 15     Every 15 minutes.    Suicide precautions: Suicide Risk: LOW; Clinical rationale to override score: modification to the care environment     Patients on Suicide Precautions should have a Combination Diet ordered that includes a Diet selection(s) AND a Behavioral Tray selection for Safe Tray - with utensils, or Safe Tray - NO utensils       Order Specific Question:   Suicide Risk     Answer:   LOW     Order Specific Question:   Clinical rationale to override score:     Answer:   modification to the care environment          DIagnoses:   Unspecified mood disorder with psychosis versus schizophrenia spectrum disorder  Unspecified anxiety disorder  Autism spectrum disorder, by history  Eating disorder, by history  Trauma history, rule out PTSD  Rule out cluster B traits         Plan:   Medications:  -- Risperidone was discontinued in the emergency room  -- Increase Abilify, 15 mg every morning for psychosis and mood stabilization  -- Start gabapentin, 100 mg every morning for anxiety and 300 mg at bedtime for sleep  --Increase trazodone, 100 mg at bedtime.  -- Additional medications include propranolol, Zyprexa, melatonin, hydroxyzine    Medical:  --Internal medicine to follow up for medical problems   --Blood work was reviewed and is within normal limits.  --EKG is WNL    Consults:   --Care was coordinated with the treatment team.   --The patient was consulted on nature of illness and treatment options.      Disposition Plan   Reason for ongoing admission: is unable to care for self due to severe psychosis or humberto  [facility]  Discharge Medications: not ordered  Follow-up Appointments: not scheduled  Legal Status: voluntary   Discharge will be granted once symptoms improved.    Referral  Status  [facility] (IRTS):   [facility]: Sent 10/15  [facility]: sent 10/15   [facility]: Sent 10/15  [facility]: Sent 10/15    Robby SANTOS CNP    This note was created with the help of Dragon dictation system. All grammatical/typing errors or context distortion are unintentional and inherent to software.

## 2024-10-21 NOTE — PROGRESS NOTES
Rehab Group    Start time: 2030  End time: 2120  Patient time total: 50 minutes    attended full group     #3 attended   Group Type: recreation   Group Topic Covered: activity therapy, Physical activity, and relaxation        Group Session Detail:  Exercise and stress relief     Patient Response/Contribution:  cooperative with task, attentive, and actively engaged       Patient Detail:    Pt actively participated in a structured Therapeutic Recreation group with a focus on leisure participation, socializing, and exercise. Pt participated in the guided exercise for the full duration of the group. Pt followed along, engaged in the guided chair exercise routine and added to the discussion prompts throughout the routine.  Pt was encouraged to use positive imagery with the deep breathing and stretching to foster relaxation, improves focus, and reduce stress.      Activity Therapy Per 15 min ()      Patient Active Problem List   Diagnosis    Juvenile idiopathic scoliosis, unspecified spinal region    Raynaud's phenomenon without gangrene    Psychosis (H)    Suicidal ideation    Hallucinations    Psychosis, atypical (H)

## 2024-10-21 NOTE — PLAN OF CARE
BEH IP Unit Acuity Rating Score (UARS)  Patient is given one point for every criteria they meet.    CRITERIA SCORING   On a 72 hour hold, court hold, committed, stay of commitment, or revocation. 0    Patient LOS on BEH unit exceeds 20 days. 0  LOS: 9   Patient under guardianship, 55+, otherwise medically complex, or under age 11. 0   Suicide ideation without relief of precipitating factors. 1   Current plan for suicide. 0   Current plan for homicide. 0   Imminent risk or actual attempt to seriously harm another without relief of factors precipitating the attempt. 0   Severe dysfunction in daily living (ex: complete neglect for self care, extreme disruption in vegetative function, extreme deterioration in social interactions). 1   Recent (last 7 days) or current physical aggression in the ED or on unit. 0   Restraints or seclusion episode in past 72 hours. 0   Recent (last 7 days) or current verbal aggression, agitation, yelling, etc., while in the ED or unit. 0   Active psychosis. 1   Need for constant or near constant redirection (from leaving, from others, etc).  0   Intrusive or disruptive behaviors. 0   Patient requires 3 or more hours of individualized nursing care per 8-hour shift (i.e. for ADLs, meds, therapeutic interventions). 0   TOTAL 3

## 2024-10-21 NOTE — PROGRESS NOTES
10/21/24 1109   Individualization/Patient Specific Goals   Patient Vulnerabilities family/relationship conflict;history of unsuccessful treatment;lacks insight into illness;poor impulse control;limited social skills   Interprofessional Rounds   Summary There was discussion about pt's presentation on the unit as well as discharge plan   Participants advanced practice nurse;nursing;CTC   Behavioral Team Discussion   Participants Nahid SANTOS; Raymon Tarango RN; Natalie Brito CTC   Progress Improving   Anticipated length of stay 3 to 5 days   Continued Stay Criteria/Rationale SI and AH   Medical/Physical See H&P   Plan Stablize on medications and discharge to IRTS   Rationale for change in precautions or plan No change   Safety Plan Safe secure milieu   Anticipated Discharge Disposition IRTS     PRECAUTIONS AND SAFETY    Behavioral Orders   Procedures    Code 1 - Restrict to Unit    Hyper Urban Level User [place]    MMPI 2    Routine Programming     As clinically indicated    Self Injury Precaution    Status 15     Every 15 minutes.    Suicide precautions: Suicide Risk: LOW; Clinical rationale to override score: modification to the care environment     Patients on Suicide Precautions should have a Combination Diet ordered that includes a Diet selection(s) AND a Behavioral Tray selection for Safe Tray - with utensils, or Safe Tray - NO utensils       Order Specific Question:   Suicide Risk     Answer:   LOW     Order Specific Question:   Clinical rationale to override score:     Answer:   modification to the care environment       Safety  Safety WDL: WDL  Patient Location: dining room, [facility]  Observed Behavior: calm  Observed Behavior (Comment): coloring  Safety Measures: environmental rounds completed, safety rounds completed, suicide assessment completed, suicide check-in completed  Diversional Activity: music  Suicidality: Status 15, Promote patient engagement with treatment process

## 2024-10-21 NOTE — PLAN OF CARE
Problem: Psychotic Symptoms  Goal: Social and Therapeutic (Psychotic Symptoms)  Description: Pt will remain safe on the unit every shift as evidenced by identify and utilize 3 coping skills, maintain ADL's independently and attend 50% of programming.  Outcome: Progressing  Flowsheets (Taken 10/21/2024 1654)  Psychotic Symptoms Present:   affect   anxiety   thought process     Problem: Psychotic Symptoms  Intervention: Social and Therapeutic Interv (Psychotic Symptoms)  Flowsheets (Taken 10/21/2024 1654)  Social and Therapeutic Interventions (Psychotic Symptoms):   encourage socialization with peers   encourage participation in therapeutic groups and milieu activities   Goal Outcome Evaluation:    Plan of Care Reviewed With: patient        Patient is visible in the milieu,pacing in the hallway,social with selected peers, denies SI,SIB,hallucinations, denies anxiety and depression, contracted for safety in the unit.  Patient had good appetite, compliant with medications.  Participated in groups.  Remained calm and cooperative.  Will continue to monitor.

## 2024-10-21 NOTE — PROGRESS NOTES
"Rehab Group     Start time: 1015  End time: 1145  Patient time total: 75 minutes     attended partial group     #7 attended   Group Type: general health and coping, process, and music   Group Topic Covered: balanced lifestyle, cognitive activities, and self-care      Group Session Detail:      1) Music Preferences Assessment (group and individual)     2) Music Relaxation group to decrease anxiety and promote dialogue and mindfulness.     Patient Response/Contribution:  cooperative with task and listened actively      Patient Detail: Flat affect, but appropriately engaged in session, responding well to the music.      Shared about how one of her favorite musical artists (Ana Rodriguez) was hospitalized at age 16, and wrote a song \"about how it took 13 years to get sober\" (The song is called 13 beaches), which appeared to resonate with pt and was the most verbal sharing pt has done in group thus far.    Pt appeared to take cues from female peers who were reflecting on their music preferences.        Activity Therapy Per 15 min ()    Patient Active Problem List   Diagnosis    Juvenile idiopathic scoliosis, unspecified spinal region    Raynaud's phenomenon without gangrene    Psychosis (H)    Suicidal ideation    Hallucinations    Psychosis, atypical (H)       "

## 2024-10-21 NOTE — PLAN OF CARE
Team Note Due:  Monday  Assessment/Intervention/Current Symptoms and Care Coordination  Chart review and met with team, discussed pt progress, symptomology, and response to treatment.  Discussed the discharge plan and any potential impediments to discharge.    CTC met with pt to provide requested update for IRTS placement. Pt was concerned about sharing room at IRTS placements. CTC reminded pt that the organizations where referrals were made have single rooms available. [facility] also provided information on the timeline for interviews once placements reach out to interview.     CTC called [facility] and left a VM requesting a call back. CTC received a call back from [facility] reporting that it will be about another week until pt has interview.     CTC called [facility] and left vm to follow up on referral.     CTC completed acuity rating and progress note.      Discharge Plan or Goal  Pending stabilization & development of a safe discharge plan.    Dispo Plan:Intensive Residential Treatment Services (IRTS):    Medications ordered/sent to:   Provisional Discharge required: NO    Barriers to Discharge   Patient requires further psychiatric stabilization due to current symptomology    Referral Status  Intensive Residential Treatment Services (IRTS):   [facility]: Sent 10/15  [facility]: sent 10/15; Follow up week of 10/21   [facility]: Sent 10/15  [facility]: Sent 10/15; Follow up week of 10/21  Legal Status  Patient is voluntary        Contacts:  MAHNAZ signed 10/12/24 Dulce Tiara (sister) [phone]     MAHNAZ signed for Yaz Pimentel () [phone]; [email or web address]     [facility]:  [phone]  [facility]: [email or web address]  [facility]: [phone];      Upcoming Meetings and Dates/Important Information and next steps:  [facility] will follow up on IRTS referrals for pt.   [facility] will follow up with [facility] the week of 10/21  CTC will follow up with [facility]  the week of 10/21

## 2024-10-22 PROCEDURE — 128N000002 HC R&B CD/MH ADOLESCENT

## 2024-10-22 PROCEDURE — 90832 PSYTX W PT 30 MINUTES: CPT

## 2024-10-22 PROCEDURE — 90853 GROUP PSYCHOTHERAPY: CPT

## 2024-10-22 PROCEDURE — 250N000013 HC RX MED GY IP 250 OP 250 PS 637: Performed by: NURSE PRACTITIONER

## 2024-10-22 PROCEDURE — 250N000013 HC RX MED GY IP 250 OP 250 PS 637: Performed by: REGISTERED NURSE

## 2024-10-22 PROCEDURE — 99232 SBSQ HOSP IP/OBS MODERATE 35: CPT | Performed by: NURSE PRACTITIONER

## 2024-10-22 PROCEDURE — 97150 GROUP THERAPEUTIC PROCEDURES: CPT | Mod: GO

## 2024-10-22 RX ADMIN — TRAZODONE HYDROCHLORIDE 100 MG: 100 TABLET ORAL at 20:37

## 2024-10-22 RX ADMIN — GABAPENTIN 100 MG: 100 CAPSULE ORAL at 08:13

## 2024-10-22 RX ADMIN — Medication 15 MG: at 08:13

## 2024-10-22 RX ADMIN — GABAPENTIN 400 MG: 300 CAPSULE ORAL at 20:37

## 2024-10-22 RX ADMIN — POLYETHYLENE GLYCOL 3350 17 G: 17 POWDER, FOR SOLUTION ORAL at 20:38

## 2024-10-22 ASSESSMENT — ACTIVITIES OF DAILY LIVING (ADL)
ADLS_ACUITY_SCORE: 28

## 2024-10-22 NOTE — PLAN OF CARE
Group Attendance:  attended full group    Time session began: 1315  Time session ended: 1410  Patient's total time in group: 55    Total # Attendees   6   Group Type psychotherapeutic and psychoeducational     Group Topic Covered relationships and healthy coping skills     Group Session Detail Group Topic was conflict resolution. Participants discussed four types of conflict resolution styles and identified which one they use most. We discussed 4 scenarios and determined which resolution style was used in each and how to resolve each in team builder style.      Patient's response to the group topic/interactions:  listened actively, attentive, actively engaged, and engaged socially when prompted     Patient Details: Patient was soft spoken and had a closed off body language. She participated fully in the discussion.           98986 - Psychotherapy (with patient) - 60 (53+*) min  Patient Active Problem List   Diagnosis    Juvenile idiopathic scoliosis, unspecified spinal region    Raynaud's phenomenon without gangrene    Psychosis (H)    Suicidal ideation    Hallucinations    Psychosis, atypical (H)

## 2024-10-22 NOTE — PROGRESS NOTES
Rehab Group    Start time: 2030  End time: 2120  Patient time total: 30 minutes    attended partial group    #7 attended   Group Type: recreation   Group Topic Covered: healthy leisure time and social skills       Group Session Detail:  TR leisure group     Patient Response/Contribution:  cooperative with task, attentive, and guarded       Patient Detail:    Pt participated in a structured Therapeutic Recreation group with a focus on leisure participation, communication skills, and social engagement via a group game. Pt remained focused and engaged for approximately 30 minutes before deciding to leave group.  Pt was quiet and appeared guarded, minimally interacting with others in group.      Activity Therapy Per 15 min ()      Patient Active Problem List   Diagnosis    Juvenile idiopathic scoliosis, unspecified spinal region    Raynaud's phenomenon without gangrene    Psychosis (H)    Suicidal ideation    Hallucinations    Psychosis, atypical (H)

## 2024-10-22 NOTE — DISCHARGE INSTRUCTIONS
Behavioral Discharge Planning and Instructions    Summary: You were admitted on 10/12/2024  due to Suicidal Ideations.  You were treated by DANIELLE Campbell CNP, DANIELLE Ulloa,CNP, Debra Naegele, APRN,CHRISTIN, and DANIELLE Jj, CNP and discharged on 10/31/2024 from [facility]    Main Diagnosis:   Major depressive disorder, recurrent, severe with possible psychotic features       Health Care Follow-up:     Appointment: Psychiatry   Date/time: Tuesday November 12th, 2024 @ 3:30 PM In person  Provider:  Skye Singer DO   Address: [place] [address]   Phone: [phone]  Fax: [phone]     Resources  [employer]: Keely Hutchison  Email: [email or web address]  Phone: [phone]  Note: Keely will coordinate a date/time with [facility] to meet with you to start the social security process.     Information will be faxed to your outpatient providers to ensure a healthy continuity of care for you.     Attend all scheduled appointments with your outpatient providers. Call at least 24 hours in advance if you need to reschedule an appointment to ensure continued access to your outpatient providers.     Major Treatments, Procedures and Findings:  You were provided with: a psychiatric assessment, assessed for medical stability, medication evaluation and/or management, group therapy, individual therapy, and milieu management    Symptoms to Report: feeling more aggressive, increased confusion, losing more sleep, mood getting worse, or thoughts of suicide    Early warning signs can include: increased depression or anxiety sleep disturbances increased thoughts or behaviors of suicide or self-harm  increased unusual thinking, such as paranoia or hearing voices    Safety and Wellness:  Take all medicines as directed.  Make no changes unless your doctor suggests them.      Follow treatment recommendations.   Refrain from alcohol and non-prescribed drugs.  Ask your support system to help you reduce your access to items that could harm yourself or others. If there is a concern for safety, call 911.    Resources:   Mental Health Crisis Resources  Throughout [place]: call **CRISIS (**870171)  Crisis Text Line: is available for free, 24/7 by texting [place] to 085218  Suicide Awareness Voices of Education (SAVE) (www.save.org): 899-862-VGTE (9916)  The National Suicide Prevention Lifeline is now: 988 Suicide and Crisis Lifeline. Call 988 anytime.  National [place] on Mental Illness (www.[place].zabrina.org): 110.151.2935 or 726-942-9246.  [place]: text the word LIFE to 82526 for immediate support and crisis intervention  Mental Health Consumer/Survivor Network of [place] (www.mhcsn.net): 790.671.6077 or 765-697-6930  Mental Health Association of [place] (www.mentalhealth.org): 451.808.4477 or 897-102-8175  Peer Support Connection [place] [facility] 1-935.584.2534 Available from 5pm - 9am (7 days a week/365 days a year)  Call or Text 987, [facility] 1-649.924.4921, or [facility] 1-633.387.9673 Community Outreach for Psych Emergencies      General Medication Instructions:   See your medication sheet(s) for instructions.   Take all medicines as directed.  Make no changes unless your doctor suggests them.   Go to all your doctor visits.  Be sure to have all your required lab tests. This way, your medicines can be refilled on time.  Do not use any drugs not prescribed by your doctor.  Avoid alcohol.    Advance Directives:   Scanned document on file with [employer]? No scanned doc  Is document scanned? Pt states no documents  Honoring Choices Your Rights Handout: Informed and given  Was more information offered? Materials given    The Treatment team has appreciated the opportunity to work with you. If you have any questions or concerns about your recent admission, you can contact the unit which can receive your call 24 hours a day, 7 days a  week. They will be able to get in touch with a Provider if needed. The unit number is [phone] .

## 2024-10-22 NOTE — PLAN OF CARE
"  Problem: Sleep Disturbance  Goal: Adequate Sleep/Rest  Description: Pt will sleep 6-8 hours nightly as evidenced by practicing regular routine of sleep hygiene; inform night staff of not sleeping and timely discuss concerns with provider to identify concerns.  Outcome: Not Progressing   Goal Outcome Evaluation:          Mental Health:  Pt presents as pleasant, improved eye contact, groomed appearance, endorses less depression and anxiety, denies any SI/SIB thoughts and thinks the voice is \"almost gone.\" Pt more visible in milieu, med compliant, no observed or reported side effects; pt reports poor sleep and wishes it to be better, pt confirms she is doing regular sleep hygiene and not sleeping during the day. Pt reports she normally sleeps with sound machine at night when home because of being a light sleeper. Pt given reassurance writer will find a sound machine for pt. Pt attends groups, cooperative and appears to be coping well on the unit. No aggression or unsafe behaviors.    Medical:  Pt denies any physical pain; appetite good or \"better\" and still wakes up at least 3 times during the night.     Vitals:   /69   Pulse 72   Temp 97.7  F (36.5  C)   Resp 18   Ht 1.727 m (5' 8\")   Wt 62.9 kg (138 lb 11.2 oz)   LMP 08/07/2024   SpO2 100%   BMI 21.09 kg/m         PRNs Given:  None    Continue to assess and monitor closely, suicide and self injury precautions for safety.                  "

## 2024-10-22 NOTE — PLAN OF CARE
"Problem: Psychotic Symptoms  Goal: Psychotic Symptoms  Description: Signs and symptoms of listed problems will be absent or manageable.  Outcome: Progressing   Goal Outcome Evaluation:  Plan of Care Reviewed With: patient      /76   Pulse 67   Temp 97  F (36.1  C) (Temporal)   Resp 16   Ht 1.727 m (5' 8\")   Wt 62.9 kg (138 lb 11.2 oz)   LMP 08/07/2024   SpO2 93%   BMI 21.09 kg/m      [facility] first part of the shift. On approach, she was calm, quiet, withdrawn and guarded. Mood was depressive. Affect was flat and blunted. She endorsed depression of 3, denied anxiety, SI/SIB/HI and A/V hallucinations. Pt denied any physical pain. She contracted for safety. As the shift progress she was notably social and interactive with selected peers. She watched TV.  Ate 75% off her dinner tray and was medication compliant. Denied any medication adverse reactions.    "

## 2024-10-22 NOTE — PLAN OF CARE
BEH IP Unit Acuity Rating Score (UARS)  Patient is given one point for every criteria they meet.    CRITERIA SCORING   On a 72 hour hold, court hold, committed, stay of commitment, or revocation. 0    Patient LOS on BEH unit exceeds 20 days. 0  LOS: 10   Patient under guardianship, 55+, otherwise medically complex, or under age 11. 0   Suicide ideation without relief of precipitating factors. 1   Current plan for suicide. 0   Current plan for homicide. 0   Imminent risk or actual attempt to seriously harm another without relief of factors precipitating the attempt. 0   Severe dysfunction in daily living (ex: complete neglect for self care, extreme disruption in vegetative function, extreme deterioration in social interactions). 1   Recent (last 7 days) or current physical aggression in the ED or on unit. 0   Restraints or seclusion episode in past 72 hours. 0   Recent (last 7 days) or current verbal aggression, agitation, yelling, etc., while in the ED or unit. 0   Active psychosis. 1   Need for constant or near constant redirection (from leaving, from others, etc).  0   Intrusive or disruptive behaviors. 0   Patient requires 3 or more hours of individualized nursing care per 8-hour shift (i.e. for ADLs, meds, therapeutic interventions). 0   TOTAL 3

## 2024-10-22 NOTE — PROGRESS NOTES
[facility], [place]   Psychiatric Progress Note        Interim History:   Team meeting report: The patient's care was discussed with the treatment team during the daily team meeting and/or staff's chart notes were reviewed.  Staff reports that the patient has been calm, pleasant, cooperative.  That she is visible in the milieu and attending groups.  No behavioral issues.  Denies suicidal ideation and self-injury behaviors.  Denied depression and anxiety.  Hallucinations very throughout the day.  She ate well.  Med compliant.  In the morning, the patient was upset after talking to her sister who told her that she does not want to take care of her anymore.  She was isolated after that but in the evening she felt better and spent time outside of her room.  Anxiety was relieved by hydroxyzine.    Met with patient.  Continues to struggle with her sleep.  She woke up 3 times yesterday which is better than what it was before.  We discussed increasing the dose of either the gabapentin or the trazodone.  The patient opted to to increase gabapentin.  She is aware of the addictive properties of gabapentin but does not worry about it.  Reports that her mood is stable.  She is no longer suicidal.  No urges to harm herself.  Auditory hallucinations (1 voice) vary throughout the day.  This morning, the voice is minimal.  He did not mention visual hallucinations.      The patient requested to use her cell phone to pay her bills which is reasonable.  We discussed disposition.  The patient is hoping to discharge sometimes that this week but understands that this is out of our control and is agreeable to stay as long as it is necessary.         Medications:     Current Facility-Administered Medications   Medication Dose Route Frequency Provider Last Rate Last Admin    ARIPiprazole (ABILIFY) half-tab 15 mg  15 mg Oral Daily Robby Soriano APRN CNP   15 mg at 10/22/24 6090    gabapentin  (NEURONTIN) capsule 100 mg  100 mg Oral QAM Robby Soriano, APRN CNP   100 mg at 10/22/24 0813    gabapentin (NEURONTIN) capsule 400 mg  400 mg Oral At Bedtime Anastasiiaa, Robby Minsebaseva, APRN CNP        polyethylene glycol (MIRALAX) Packet 17 g  17 g Oral At Bedtime Sandra Mayers APRN CNP   17 g at 10/20/24 2027    traZODone (DESYREL) tablet 100 mg  100 mg Oral At Bedtime Anastasiiaa, Robby Cloudeva, APRN CNP   100 mg at 10/21/24 2000            Allergies:     Allergies   Allergen Reactions    Cats     Seasonal Allergies             Labs:     Recent Results (from the past 672 hour(s))   HCG qualitative urine    Collection Time: 10/11/24  9:22 AM   Result Value Ref Range    hCG Urine Qualitative Negative Negative   Urine Drug Screen Panel    Collection Time: 10/11/24  9:22 AM   Result Value Ref Range    Amphetamines Urine Screen Negative Screen Negative    Barbituates Urine Screen Negative Screen Negative    Benzodiazepine Urine Screen Negative Screen Negative    Cannabinoids Urine Screen Negative Screen Negative    Cocaine Urine Screen Negative Screen Negative    Fentanyl Qual Urine Screen Negative Screen Negative    Opiates Urine Screen Negative Screen Negative    PCP Urine Screen Negative Screen Negative   Asymptomatic COVID-19 Virus (Coronavirus) by PCR Nose    Collection Time: 10/11/24  6:16 PM    Specimen: Nose; Swab   Result Value Ref Range    SARS CoV2 PCR Negative Negative   EKG 12-lead, tracing only    Collection Time: 10/12/24 10:32 AM   Result Value Ref Range    Systolic Blood Pressure  mmHg    Diastolic Blood Pressure  mmHg    Ventricular Rate 57 BPM    Atrial Rate 57 BPM    DC Interval 144 ms    QRS Duration 94 ms     ms    QTc 399 ms    P Axis 32 degrees    R AXIS 79 degrees    T Axis 37 degrees    Interpretation ECG       Sinus bradycardia  Otherwise normal ECG  No previous ECGs available  Confirmed by MD VIVIAN, LANETTE ([id number]) on 10/13/2024 10:23:47 AM     Vitamin D  Deficiency    Collection Time: 10/12/24 11:21 AM   Result Value Ref Range    Vitamin D, Total (25-Hydroxy) 27 20 - 50 ng/mL   Hemoglobin A1c    Collection Time: 10/12/24 11:21 AM   Result Value Ref Range    Estimated Average Glucose 80 <117 mg/dL    Hemoglobin A1C 4.4 <5.7 %   Folate    Collection Time: 10/12/24 11:21 AM   Result Value Ref Range    Folic Acid 11.6 4.6 - 34.8 ng/mL   Vitamin B12    Collection Time: 10/12/24 11:21 AM   Result Value Ref Range    Vitamin B12 414 232 - 1,245 pg/mL   Comprehensive metabolic panel    Collection Time: 10/12/24 11:21 AM   Result Value Ref Range    Sodium 139 135 - 145 mmol/L    Potassium 4.1 3.4 - 5.3 mmol/L    Carbon Dioxide (CO2) 23 22 - 29 mmol/L    Anion Gap 11 7 - 15 mmol/L    Urea Nitrogen 8.2 6.0 - 20.0 mg/dL    Creatinine 0.68 0.51 - 0.95 mg/dL    GFR Estimate >90 >60 mL/min/1.73m2    Calcium 9.5 8.8 - 10.4 mg/dL    Chloride 105 98 - 107 mmol/L    Glucose 88 70 - 99 mg/dL    Alkaline Phosphatase 43 40 - 150 U/L    AST 20 0 - 45 U/L    ALT 6 0 - 50 U/L    Protein Total 7.0 6.4 - 8.3 g/dL    Albumin 4.3 3.5 - 5.2 g/dL    Bilirubin Total 0.6 <=1.2 mg/dL   CBC with platelets and differential    Collection Time: 10/12/24 11:21 AM   Result Value Ref Range    WBC Count 6.1 4.0 - 11.0 10e3/uL    RBC Count 4.03 3.80 - 5.20 10e6/uL    Hemoglobin 12.2 11.7 - 15.7 g/dL    Hematocrit 37.2 35.0 - 47.0 %    MCV 92 78 - 100 fL    MCH 30.3 26.5 - 33.0 pg    MCHC 32.8 31.5 - 36.5 g/dL    RDW 12.3 10.0 - 15.0 %    Platelet Count 216 150 - 450 10e3/uL    % Neutrophils 62 %    % Lymphocytes 30 %    % Monocytes 6 %    % Eosinophils 1 %    % Basophils 1 %    % Immature Granulocytes 0 %    NRBCs per 100 WBC 0 <1 /100    Absolute Neutrophils 3.8 1.6 - 8.3 10e3/uL    Absolute Lymphocytes 1.8 0.8 - 5.3 10e3/uL    Absolute Monocytes 0.4 0.0 - 1.3 10e3/uL    Absolute Eosinophils 0.1 0.0 - 0.7 10e3/uL    Absolute Basophils 0.1 0.0 - 0.2 10e3/uL    Absolute Immature Granulocytes 0.0 <=0.4 10e3/uL     Absolute NRBCs 0.0 10e3/uL   Lipid panel reflex to direct LDL    Collection Time: 10/14/24  7:59 AM   Result Value Ref Range    Cholesterol 136 <200 mg/dL    Triglycerides 50 <150 mg/dL    Direct Measure HDL 57 >=50 mg/dL    LDL Cholesterol Calculated 69 <100 mg/dL    Non HDL Cholesterol 79 <130 mg/dL            Precautions:     Behavioral Orders   Procedures    Code 1 - Restrict to Unit    [place]    MMPI 2    Routine Programming     As clinically indicated    Self Injury Precaution    Status 15     Every 15 minutes.    Suicide precautions: Suicide Risk: LOW; Clinical rationale to override score: modification to the care environment     Patients on Suicide Precautions should have a Combination Diet ordered that includes a Diet selection(s) AND a Behavioral Tray selection for Safe Tray - with utensils, or Safe Tray - NO utensils       Order Specific Question:   Suicide Risk     Answer:   LOW     Order Specific Question:   Clinical rationale to override score:     Answer:   modification to the care environment            Psychiatric Examination:                      Weight is 138 lbs 11.2 oz  Body mass index is 21.09 kg/m .    Appearance: awake, alert and adequately groomed  Attitude:  cooperative  Eye Contact:  fair  Mood:  anxious, depressed, and better  Affect:  mood congruent  Speech:  dysarthria  Psychomotor Behavior:  no evidence of tardive dyskinesia, dystonia, or tics  Throught Process:  logical and goal oriented  Associations:  no loose associations  Thought Content:  no evidence of suicidal ideation or homicidal ideation and reports auditory hallucination and a feeling that somebody is behind her at all times.  Insight:  fair  Judgement:  fair  Oriented to:  time, person, and place  Attention Span and Concentration:  intact  Recent and Remote Memory:  intact           Precautions:     Behavioral Orders   Procedures    Code 1 - Restrict to Unit    [place]    MMPI 2    Routine Programming     As clinically  indicated    Self Injury Precaution    Status 15     Every 15 minutes.    Suicide precautions: Suicide Risk: LOW; Clinical rationale to override score: modification to the care environment     Patients on Suicide Precautions should have a Combination Diet ordered that includes a Diet selection(s) AND a Behavioral Tray selection for Safe Tray - with utensils, or Safe Tray - NO utensils       Order Specific Question:   Suicide Risk     Answer:   LOW     Order Specific Question:   Clinical rationale to override score:     Answer:   modification to the care environment          DIagnoses:   Unspecified mood disorder with psychosis versus schizophrenia spectrum disorder  Unspecified anxiety disorder  Autism spectrum disorder, by history  Eating disorder, by history  Trauma history, rule out PTSD  Rule out cluster B traits         Plan:   Medications:  -- Risperidone was discontinued in the emergency room  -- Abilify, 15 mg every morning for psychosis and mood stabilization  -- Gabapentin 100 mg every morning. Increase bedtime dose to 400 mg for sleep  -- Trazodone, 100 mg at bedtime.  -- Additional medications include propranolol, Zyprexa, melatonin, hydroxyzine    Medical:  --Internal medicine to follow up for medical problems   --Blood work was reviewed and is within normal limits.  --EKG is WNL    Consults:   --Care was coordinated with the treatment team.   --The patient was consulted on nature of illness and treatment options.      Disposition Plan   Reason for ongoing admission: is unable to care for self due to severe psychosis or humberto  Discharge location: [facility] facility  Discharge Medications: not ordered  Follow-up Appointments: not scheduled  Legal Status: voluntary   Discharge will be granted once symptoms improved.    Referral Status  Int[facility] Services ([facility]):   [facility]: Sent 10/15  [facility]: sent 10/15   [facility]: Sent 10/15  [facility]: Sent 10/15    Robby Soriano  APRN, CNP    This note was created with the help of Dragon dictation system. All grammatical/typing errors or context distortion are unintentional and inherent to software.

## 2024-10-22 NOTE — PLAN OF CARE
"Individual Therapy Note      Date of Service: October 22, 2024    Patient: Sparkle goes by \"Sparkle,\" uses she/her pronouns    Individuals Present: Ian Steve, [facility]    Session start: 1410  Session end: 1450  Session duration in minutes: 30      Modality Used: CBT and Rapport Building    Goals: Improve self esteem and motivation for recovery    Patient Description of current symptoms: Sad     Mental Status Exam:   Attitude: cooperative  Eye Contact: fair  Mood: sad  and depressed  Affect: intensity is blunted and intensity is flat  Speech:  soft spoken  Psychomotor Behavior: no evidence of tardive dyskinesia, dystonia, or tics  Thought Process:  logical  Associations: no loose associations  Thought Content: auditory hallucinations present  Insight: fair  Judgement: fair  Attention Span and Concentration: intact    Pt progress: Patient was upset about a phone conversation with her sister. Her sister is upset that patient was putting her arm in the oven while working. Patient did not think it was that big of a deal. Sister wanted patient to discuss it in therapy. We discussed the potential for burning herself and writer emphasized it is unhealthy. Patient was tearful and hurt by her sister reporting that she cannot handle patient's behaviors anymore. Patient is fearful of losing sister who is her sole support. She feels her sister doesn't think the patient is progressing fast enough. The voce she hears continually tells her it doesn't want her to change. The voice is louder than sister's voice and the patient believes what the voice tells her. Patient showed writer a paper with statements the voice has been making, all of which were derogatory and accusatory. We discussed which voice is more loving and wants the best for her. Patient feels she is not deserving because she is a liar. We examined the evidence for whether patient is deserving. Her reasons that she is deserving is she is honest, nice, not " selfish, and trustworthy. We talked of ways to quiet the voice. Writer gave patient handouts on core beliefs to work on and we will meet again to discuss.    Treatment Objective(s) Addressed:   The focus of this session was on rapport building, identifying and practicing coping strategies, processing feelings related to conflict with sister, building distress tolerance, and building self-esteem     Progress Towards Goals and Assessment of Patient:   Patient is making progress towards treatment goals as evidenced by seeking treatment and engaging in therapy.       Therapeutic Intervention(s):   Provided active listening, unconditional positive regard, and validation.   Engaged in guided discovery, explored patient's perspectives and helped expand them through socratic dialogue and Using CBT tools and instruction to improve insight, awareness, identifying unhealthy patterns and self-talk and replacing with healthier patterns and self-talk      Plan/next step: Meet to work on core beliefs    30 minute psychotherapy    Patient Active Problem List   Diagnosis    Juvenile idiopathic scoliosis, unspecified spinal region    Raynaud's phenomenon without gangrene    Psychosis (H)    Suicidal ideation    Hallucinations    Psychosis, atypical (H)

## 2024-10-22 NOTE — PLAN OF CARE
Care Coordinator Note(s):    Care Request(s):   Psychiatry   Preferences: Time Frame: 1 Month +, PM  Notes: Skye Singer DO/ [facility].  Pt will need a follow up med management appt. Will likely discharge next week.      Care Outcome(s):    CC Progress Note(s)/ Documentation:      Appointment: Psychiatry   Date/time: Tuesday November 12th, 2024 @ 3:30 PM In person  Provider:  Skye Singer DO   Address: [place] [id number]   Phone: [phone]  Fax: [phone]       -Jazz Tucker  Adult Behavioral Health Care Coordinator

## 2024-10-22 NOTE — PLAN OF CARE
Team Note Due:  Monday  Assessment/Intervention/Current Symptoms and Care Coordination  Chart review and met with team, discussed pt progress, symptomology, and response to treatment.  Discussed the discharge plan and any potential impediments to discharge.    [facility] received email from [facility] requesting to interview pt this friday at 1pm. [facility] provided clinical updated and informed [facility] that pt is ready to interview.     CTC checked in with pt. Pt inquired about CTC helping with applying for social security. Pt reported that her MHCM said that we would be able to help with this process. CTC informed pt that we do not help with applying for social security and our main focus is discharge planning. CTC informed pt of services that will help with this process. Pt was agreeable to [facility] coordinating with service ([facility]). Pt was also updated about interview with [facility] on Friday. Pt inquired if she could go on trips that were already paid for and planned while at [facility]. CTC encouraged pt to ask this question during interview on Friday. CTC did state there is a good possibility that the [facility] will discharge her.     CTC completed acuity rating.      Discharge Plan or Goal  Pending stabilization & development of a safe discharge plan.    Dispo Plan:[facility]idential Treatment Services ([facility]):    Medications ordered/sent to:   Provisional Discharge required: NO    Barriers to Discharge   Patient requires further psychiatric stabilization due to current symptomology    Referral Status  Intensive Residential Treatment Services (IRTS):   [facility]: Sent 10/15  [facility]: sent 10/15; Follow up week of 10/21; Interview 10/ 25 @ 1pm  [facility]: Sent 10/15  [facility]: Sent 10/15; Follow up week of 10/21  Legal Status  Patient is voluntary        Contacts:  MAHNAZ signed 10/12/24 Dulce Menjivar (sister) [phone]     MAHNAZ signed for Yaz Pimentel (case  manager) [phone]; [email or web address]     [facility]:  [phone]  [facility] [place]: [email or web address]  [facility]: [phone];      Upcoming Meetings and Dates/Important Information and next steps:  [facility] will connect with [facility] to coordinate meeting.

## 2024-10-22 NOTE — PLAN OF CARE
10/22/24 0655   Sleep/Rest   Sleep/Rest/Relaxation no problem identified;appears asleep   Sleep Hygiene Promotion awakenings minimized;noise level reduced   Night Time # Hours 7 hours     Patient appeared to be sleeping with no awaking, slept for 7 hours at the time of this note.    Problem: Sleep Disturbance  Goal: Adequate Sleep/Rest  Outcome: Progressing   Goal Outcome Evaluation:

## 2024-10-22 NOTE — PLAN OF CARE
Rehab Group    Start time: 1015  End time: 1105  Patient time total: 45 minutes    attended full group    #7 attended   Group Type: occupational therapy   Group Topic Covered: balanced lifestyle, cognitive activities, coping skills, healthy leisure time, problem solving, and social skills       Group Session Detail:  OT CLINIC     Patient Response/Contribution:  cooperative with task, organized, safe use of materials/supplies, and actively engaged       Patient Detail:    Occupational therapy clinic to facilitate coping skill exploration, creative expression within personally meaningful activities, and clinical observation of social, cognitive, and kinesthetic performance skills. Pt response: Pt presented with calm mood, blunted affect. IND to initiate, gather materials, sequence, and adjust to workspace demands as needed for a familiar creative expressive task. Demonstrated good focus and organization. Minimally social with staff/peers but overall pleasant participant.       [id number] OT Group (2 or more in attendance)      Patient Active Problem List   Diagnosis    Juvenile idiopathic scoliosis, unspecified spinal region    Raynaud's phenomenon without gangrene    Psychosis (H)    Suicidal ideation    Hallucinations    Psychosis, atypical (H)

## 2024-10-23 PROCEDURE — 97150 GROUP THERAPEUTIC PROCEDURES: CPT | Mod: GO

## 2024-10-23 PROCEDURE — 128N000002 HC R&B CD/MH ADOLESCENT

## 2024-10-23 PROCEDURE — 99232 SBSQ HOSP IP/OBS MODERATE 35: CPT | Performed by: NURSE PRACTITIONER

## 2024-10-23 PROCEDURE — 250N000013 HC RX MED GY IP 250 OP 250 PS 637: Performed by: REGISTERED NURSE

## 2024-10-23 PROCEDURE — 250N000013 HC RX MED GY IP 250 OP 250 PS 637: Performed by: NURSE PRACTITIONER

## 2024-10-23 RX ADMIN — TRAZODONE HYDROCHLORIDE 100 MG: 100 TABLET ORAL at 19:19

## 2024-10-23 RX ADMIN — Medication 15 MG: at 08:03

## 2024-10-23 RX ADMIN — GABAPENTIN 400 MG: 300 CAPSULE ORAL at 19:19

## 2024-10-23 RX ADMIN — GABAPENTIN 100 MG: 100 CAPSULE ORAL at 08:03

## 2024-10-23 RX ADMIN — HYDROXYZINE HYDROCHLORIDE 25 MG: 25 TABLET, FILM COATED ORAL at 10:20

## 2024-10-23 RX ADMIN — POLYETHYLENE GLYCOL 3350 17 G: 17 POWDER, FOR SOLUTION ORAL at 19:19

## 2024-10-23 ASSESSMENT — ACTIVITIES OF DAILY LIVING (ADL)
ADLS_ACUITY_SCORE: 0
ADLS_ACUITY_SCORE: 28
ADLS_ACUITY_SCORE: 0
HYGIENE/GROOMING: INDEPENDENT
ADLS_ACUITY_SCORE: 0
ORAL_HYGIENE: INDEPENDENT
ADLS_ACUITY_SCORE: 0
DRESS: SCRUBS (BEHAVIORAL HEALTH)
ADLS_ACUITY_SCORE: 0

## 2024-10-23 NOTE — PLAN OF CARE
"Problem: Psychotic Symptoms  Goal: Psychotic Symptoms  Description: Signs and symptoms of listed problems will be absent or manageable.  Outcome: Progressing   Goal Outcome Evaluation:  Pt napped the first part of the shift. She [facility]. On approach, she was calm, quiet, withdrawn and guarded. Mood was depressive. Affect was flat and blunted. She endorsed depression of 4, anxiety of 3 attributed to \" all the stuff going on in my life\" pt would not share the triggers. She however denied SI/SIB/HI and A/V hallucinations. Pt denied any physical pain. She contracted for safety. Ate 100% off her dinner tray and was medication compliant. Denied any medication adverse reactions   "

## 2024-10-23 NOTE — PLAN OF CARE
Rehab Group    Start time: 1115  End time: 1200  Patient time total: 45 minutes    attended full group    #5 attended   Group Type: occupational therapy   Group Topic Covered: balanced lifestyle, cognitive activities, coping skills, healthy leisure time, problem solving, and social skills       Group Session Detail:  OT CLINIC     Patient Response/Contribution:  cooperative with task, organized, safe use of materials/supplies, and actively engaged       Patient Detail:    Occupational therapy clinic to facilitate coping skill exploration, creative expression within personally meaningful activities, and clinical observation of social, cognitive, and kinesthetic performance skills. Pt response: Pt presented with calm mood, blunted affect. IND to initiate, gather materials, sequence, and adjust to workspace demands as needed for her familiar collage-based task. IND With all performance skills. Demonstrated good focus and task planning. Pt was a quiet participant, minimally social with peers but able to request assist when needed.       [id number] OT Group (2 or more in attendance)      Patient Active Problem List   Diagnosis    Juvenile idiopathic scoliosis, unspecified spinal region    Raynaud's phenomenon without gangrene    Psychosis (H)    Suicidal ideation    Hallucinations    Psychosis, atypical (H)

## 2024-10-23 NOTE — PLAN OF CARE
BEH IP Unit Acuity Rating Score (UARS)  Patient is given one point for every criteria they meet.    CRITERIA SCORING   On a 72 hour hold, court hold, committed, stay of commitment, or revocation. 0    Patient LOS on BEH unit exceeds 20 days. 0  LOS: 11   Patient under guardianship, 55+, otherwise medically complex, or under age 11. 0   Suicide ideation without relief of precipitating factors. 1   Current plan for suicide. 0   Current plan for homicide. 0   Imminent risk or actual attempt to seriously harm another without relief of factors precipitating the attempt. 0   Severe dysfunction in daily living (ex: complete neglect for self care, extreme disruption in vegetative function, extreme deterioration in social interactions). 1   Recent (last 7 days) or current physical aggression in the ED or on unit. 0   Restraints or seclusion episode in past 72 hours. 0   Recent (last 7 days) or current verbal aggression, agitation, yelling, etc., while in the ED or unit. 0   Active psychosis. 0   Need for constant or near constant redirection (from leaving, from others, etc).  0   Intrusive or disruptive behaviors. 0   Patient requires 3 or more hours of individualized nursing care per 8-hour shift (i.e. for ADLs, meds, therapeutic interventions). 0   TOTAL 2        yes

## 2024-10-23 NOTE — PLAN OF CARE
"  Problem: Sleep Disturbance  Goal: Adequate Sleep/Rest  Description: Pt will sleep 6-8 hours nightly as evidenced by practicing regular routine of sleep hygiene; inform night staff of not sleeping and timely discuss concerns with provider to identify concerns.  Outcome: In Progress   Goal Outcome Evaluation:     Mental Health:  Pt presents quiet, withdrawn flat affect, pleasant on approach, endorses less depression, \"sometimes\" in response to SI, denies any specific plan, thought or intent; contracts and able to keep self safe; pt confirms she still hears the voice, but says it is less bothersome than the past; med compliant, no observed or  reported side effects; writer works with pt to discuss what to expect during [facility] interview; pt practices her techniques and even reports she plan to journal some of her planned responses or question; pt strongly encouraged to invest in herself with IRTS and the remaining priorities will fall into place; pt little anxious about future and writer gives aromatherapy, prn med and pt plans to attend group to distract self-pt attends group, paces almanza and listening to music headphones; pt request sound machine for this evening to help her with sleeping.    Medical:  Pt denies any physical pain; appetite and sleep are slowly improving.     Vitals:   /73 (BP Location: Right arm, Patient Position: Sitting)   Pulse 60   Temp (!) 96.4  F (35.8  C) (Temporal)   Resp 16   Ht 1.727 m (5' 8\")   Wt 62.9 kg (138 lb 11.2 oz)   LMP 08/07/2024   SpO2 100%   BMI 21.09 kg/m         PRNs Given:  Hydroxyzine 25 mg at 10:30 am helpful for anxiety per pt report.    Continue to assess and monitor closely, self injury and suicide precautions for safety.                      "

## 2024-10-23 NOTE — PROGRESS NOTES
[facility], [place]   Psychiatric Progress Note        Interim History:   Team meeting report: The patient's care was discussed with the treatment team during the daily team meeting and/or staff's chart notes were reviewed.  Nursing staff reports the patient is calm, pleasant, cooperative.  She is visible in the milieu and attending groups.  Denied depression, anxiety, suicidal ideation and self-injury behaviors.  Continues to provide different and sometimes conflicting reports of the auditory and visual hallucinations, sometimes denying all and others reporting both of them.  She is med compliant.  Eating well.  No behavioral issues.  Slept through the night.    Met with patient.  She shows me a note that she wrote about 5 days ago that describe the auditory hallucinations.  The note was derogatory and commending in nature.  She is still hearing only 1 voice, currently quiet.  She reports visual hallucinations of seeing a person behind her who sometimes go in front of her and talks to her in a negative way.  The person does not have a face.  The hallucinations have subsided significantly with initiation of the Abilify.  She is interested in increasing the dose little bit more prior to discharge.  The patient slept better last night.  She woke up but was able to fall back to sleep without difficulties.  The patient wanted me to talk to her sister about her results from the psychological testing.  Her sister is working but will be off on Friday.  We discussed disposition.  The patient knows that she has an interview on Friday.  She is hoping to discharge soon after.  No other questions or concerns.         Medications:     Current Facility-Administered Medications   Medication Dose Route Frequency Provider Last Rate Last Admin    ARIPiprazole (ABILIFY) half-tab 15 mg  15 mg Oral Daily Robby Soriano APRN CNP   15 mg at 10/23/24 0803    gabapentin (NEURONTIN) capsule 100 mg   100 mg Oral QAM Robby Sorianoashleyderrick, APRN CNP   100 mg at 10/23/24 0803    gabapentin (NEURONTIN) capsule 400 mg  400 mg Oral At Bedtime Christie Sharlatyian Sachin, APRN CNP   400 mg at 10/22/24 2037    polyethylene glycol (MIRALAX) Packet 17 g  17 g Oral At Bedtime Sandra Mayers APRN CNP   17 g at 10/22/24 2038    traZODone (DESYREL) tablet 100 mg  100 mg Oral At Bedtime Christie, Robby Berumena, APRN CNP   100 mg at 10/22/24 2037            Allergies:     Allergies   Allergen Reactions    Cats     Seasonal Allergies             Labs:     Recent Results (from the past 4 weeks)   HCG qualitative urine    Collection Time: 10/11/24  9:22 AM   Result Value Ref Range    hCG Urine Qualitative Negative Negative   Urine Drug Screen Panel    Collection Time: 10/11/24  9:22 AM   Result Value Ref Range    Amphetamines Urine Screen Negative Screen Negative    Barbituates Urine Screen Negative Screen Negative    Benzodiazepine Urine Screen Negative Screen Negative    Cannabinoids Urine Screen Negative Screen Negative    Cocaine Urine Screen Negative Screen Negative    Fentanyl Qual Urine Screen Negative Screen Negative    Opiates Urine Screen Negative Screen Negative    PCP Urine Screen Negative Screen Negative   Asymptomatic COVID-19 Virus (Coronavirus) by PCR Nose    Collection Time: 10/11/24  6:16 PM    Specimen: Nose; Swab   Result Value Ref Range    SARS CoV2 PCR Negative Negative   EKG 12-lead, tracing only    Collection Time: 10/12/24 10:32 AM   Result Value Ref Range    Systolic Blood Pressure  mmHg    Diastolic Blood Pressure  mmHg    Ventricular Rate 57 BPM    Atrial Rate 57 BPM    CT Interval 144 ms    QRS Duration 94 ms     ms    QTc 399 ms    P Axis 32 degrees    R AXIS 79 degrees    T Axis 37 degrees    Interpretation ECG       Sinus bradycardia  Otherwise normal ECG  No previous ECGs available  Confirmed by MD VIVIAN, LANETTE ([id number]) on 10/13/2024 10:23:47 AM     Vitamin D Deficiency     Collection Time: 10/12/24 11:21 AM   Result Value Ref Range    Vitamin D, Total (25-Hydroxy) 27 20 - 50 ng/mL   Hemoglobin A1c    Collection Time: 10/12/24 11:21 AM   Result Value Ref Range    Estimated Average Glucose 80 <117 mg/dL    Hemoglobin A1C 4.4 <5.7 %   Folate    Collection Time: 10/12/24 11:21 AM   Result Value Ref Range    Folic Acid 11.6 4.6 - 34.8 ng/mL   Vitamin B12    Collection Time: 10/12/24 11:21 AM   Result Value Ref Range    Vitamin B12 414 232 - 1,245 pg/mL   Comprehensive metabolic panel    Collection Time: 10/12/24 11:21 AM   Result Value Ref Range    Sodium 139 135 - 145 mmol/L    Potassium 4.1 3.4 - 5.3 mmol/L    Carbon Dioxide (CO2) 23 22 - 29 mmol/L    Anion Gap 11 7 - 15 mmol/L    Urea Nitrogen 8.2 6.0 - 20.0 mg/dL    Creatinine 0.68 0.51 - 0.95 mg/dL    GFR Estimate >90 >60 mL/min/1.73m2    Calcium 9.5 8.8 - 10.4 mg/dL    Chloride 105 98 - 107 mmol/L    Glucose 88 70 - 99 mg/dL    Alkaline Phosphatase 43 40 - 150 U/L    AST 20 0 - 45 U/L    ALT 6 0 - 50 U/L    Protein Total 7.0 6.4 - 8.3 g/dL    Albumin 4.3 3.5 - 5.2 g/dL    Bilirubin Total 0.6 <=1.2 mg/dL   CBC with platelets and differential    Collection Time: 10/12/24 11:21 AM   Result Value Ref Range    WBC Count 6.1 4.0 - 11.0 10e3/uL    RBC Count 4.03 3.80 - 5.20 10e6/uL    Hemoglobin 12.2 11.7 - 15.7 g/dL    Hematocrit 37.2 35.0 - 47.0 %    MCV 92 78 - 100 fL    MCH 30.3 26.5 - 33.0 pg    MCHC 32.8 31.5 - 36.5 g/dL    RDW 12.3 10.0 - 15.0 %    Platelet Count 216 150 - 450 10e3/uL    % Neutrophils 62 %    % Lymphocytes 30 %    % Monocytes 6 %    % Eosinophils 1 %    % Basophils 1 %    % Immature Granulocytes 0 %    NRBCs per 100 WBC 0 <1 /100    Absolute Neutrophils 3.8 1.6 - 8.3 10e3/uL    Absolute Lymphocytes 1.8 0.8 - 5.3 10e3/uL    Absolute Monocytes 0.4 0.0 - 1.3 10e3/uL    Absolute Eosinophils 0.1 0.0 - 0.7 10e3/uL    Absolute Basophils 0.1 0.0 - 0.2 10e3/uL    Absolute Immature Granulocytes 0.0 <=0.4 10e3/uL    Absolute  NRBCs 0.0 10e3/uL   Lipid panel reflex to direct LDL    Collection Time: 10/14/24  7:59 AM   Result Value Ref Range    Cholesterol 136 <200 mg/dL    Triglycerides 50 <150 mg/dL    Direct Measure HDL 57 >=50 mg/dL    LDL Cholesterol Calculated 69 <100 mg/dL    Non HDL Cholesterol 79 <130 mg/dL            Precautions:     Behavioral Orders   Procedures    Code 1 - Restrict to Unit    [place]    MMPI 2    Routine Programming     As clinically indicated    Self Injury Precaution    Status 15     Every 15 minutes.    Suicide precautions: Suicide Risk: LOW; Clinical rationale to override score: modification to the care environment     Patients on Suicide Precautions should have a Combination Diet ordered that includes a Diet selection(s) AND a Behavioral Tray selection for Safe Tray - with utensils, or Safe Tray - NO utensils       Order Specific Question:   Suicide Risk     Answer:   LOW     Order Specific Question:   Clinical rationale to override score:     Answer:   modification to the care environment            Psychiatric Examination:   Temp: (!) 96.4  F (35.8  C) Temp src: Temporal BP: 109/73 Pulse: 60   Resp: 16 SpO2: 100 % O2 Device: None (Room air)    Weight is 138 lbs 11.2 oz  Body mass index is 21.09 kg/m .    Appearance: awake, alert and adequately groomed  Attitude:  cooperative  Eye Contact:  fair  Mood:  anxious, depressed, and better  Affect:  mood congruent  Speech:  dysarthria  Psychomotor Behavior:  no evidence of tardive dyskinesia, dystonia, or tics  Throught Process:  logical and goal oriented  Associations:  no loose associations  Thought Content:  no evidence of suicidal ideation or homicidal ideation and reports auditory hallucination and a feeling that somebody is behind her at all times.  Insight:  fair  Judgement:  fair  Oriented to:  time, person, and place  Attention Span and Concentration:  intact  Recent and Remote Memory:  intact           Precautions:     Behavioral Orders   Procedures     Code 1 - Restrict to Unit    [place]    MMPI 2    Routine Programming     As clinically indicated    Self Injury Precaution    Status 15     Every 15 minutes.    Suicide precautions: Suicide Risk: LOW; Clinical rationale to override score: modification to the care environment     Patients on Suicide Precautions should have a Combination Diet ordered that includes a Diet selection(s) AND a Behavioral Tray selection for Safe Tray - with utensils, or Safe Tray - NO utensils       Order Specific Question:   Suicide Risk     Answer:   LOW     Order Specific Question:   Clinical rationale to override score:     Answer:   modification to the care environment          DIagnoses:   Unspecified mood disorder with psychosis versus schizophrenia spectrum disorder  Unspecified anxiety disorder  Autism spectrum disorder, by history  Eating disorder, by history  Trauma history, rule out PTSD  Rule out cluster B traits         Plan:   Medications:  -- Risperidone was discontinued in the emergency room  -- Abilify, 15 mg every morning for psychosis and mood stabilization  -- Gabapentin 100 mg every morning. Increase bedtime dose to 400 mg for sleep  -- Trazodone, 100 mg at bedtime.  -- Additional medications include propranolol, Zyprexa, melatonin, hydroxyzine    Medical:  --Internal medicine to follow up for medical problems   --Blood work was reviewed and is within normal limits.  --EKG is WNL    Consults:   --Care was coordinated with the treatment team.   --The patient was consulted on nature of illness and treatment options.      Disposition Plan   Reason for ongoing admission: is unable to care for self due to severe psychosis or humberto  Discharge location: [facility] facility  Discharge Medications: not ordered  Follow-up Appointments: not scheduled  Legal Status: voluntary   Discharge will be granted once symptoms improved.    Referral Status  Intensive Residential Treatment Services (IRTS):   [facility]: Sent 10/15  [facility]:  sent 10/15   [facility]: Sent 10/15  [facility]: Sent 10/15    Robby SANTOS, CNP    This note was created with the help of Dragon dictation system. All grammatical/typing errors or context distortion are unintentional and inherent to software.

## 2024-10-23 NOTE — PLAN OF CARE
Team Note Due:  Monday  Assessment/Intervention/Current Symptoms and Care Coordination  Chart review and met with team, discussed pt progress, symptomology, and response to treatment.  Discussed the discharge plan and any potential impediments to discharge.       CTC completed acuity rating.      Discharge Plan or Goal  Pending stabilization & development of a safe discharge plan.    Dispo Plan:Intensive Residential Treatment Services ([facility]):    Medications ordered/sent to:   Provisional Discharge required: NO    Barriers to Discharge   Patient requires further psychiatric stabilization due to current symptomology    Referral Status  Intensive Residential Treatment Services (IRTS):   [facility]: Sent 10/15  [facility]: sent 10/15; Follow up week of 10/21; Interview 10/ 25 @ 1pm  [facility]: Sent 10/15  [facility]: Sent 10/15; Follow up week of 10/21  Legal Status  Patient is voluntary        Contacts:  MAHNAZ signed 10/12/24 Dulce Tiara (sister) [phone]     MAHNAZ signed for Yaz Pimentel () [phone]; [email or web address]:  [phone]  [facility]: [email or web address]  [facility]: [phone];      Upcoming Meetings and Dates/Important Information and next steps:  CTC will connect with [facility] [employer] to coordinate meeting.

## 2024-10-23 NOTE — PLAN OF CARE
Rehab Group    Start time: 1015  End time: 1105  Patient time total: 40 minutes    attended full group    #7 attended   Group Type: occupational therapy   Group Topic Covered: balanced lifestyle, cognitive therapy, coping skills, emotional regulation, mindfulness, problem solving, and self-care       Group Session Detail:  Mental Health Management: Challenging Unhelpful Thinking Patterns     Patient Response/Contribution:  Limited eye contact, did not share thoughts verbally, and guarded       Patient Detail:  Mental health management group with a topic of unhelpful thinking patterns/anxiety in relation to what we do and don't have control over. Educated on unhelpful thinking patterns, flexible thinking and actions one can take to feel more control, comfort and self-compassion. Pt response: Pt was present for the full duration of group and appeared to be somewhat listening to the group content but did not share verbally, nor did she appear to be actively engaged in the individual workshop portion of group.       [id number] OT Group (2 or more in attendance)      Patient Active Problem List   Diagnosis    Juvenile idiopathic scoliosis, unspecified spinal region    Raynaud's phenomenon without gangrene    Psychosis (H)    Suicidal ideation    Hallucinations    Psychosis, atypical (H)

## 2024-10-23 NOTE — PLAN OF CARE
The patient slept 7hrs during the shift. The patient did not have any behavioral or medical concerns and remain on 15min checks. RN will continue to monitor.

## 2024-10-23 NOTE — PLAN OF CARE
"  Rehab Group    Start time: 1320  End time: 1415  Patient time total: 40 minutes    attended full group    #5 attended   Group Type: occupational therapy   Group Topic Covered: balanced lifestyle, coping skills, emotional regulation, mindfulness, relaxation , and self-care       Group Session Detail:  Coping Skill Exploration: Journaling     Patient Response/Contribution:  cooperative with task, organized, engaged socially when prompted, and Limited eye contact       Patient Detail:    General health and coping exploration group. Pt was presented with information on the benefits of journaling, ideas on various journaling formats, and at the end of group, assembled their own journal with preprinted questions guiding reflection on goal planning, self awareness, positivity/optimism, learning, productivity, social supports, laughter, asking for help, etc. Pt Response: Pt demonstrated selective engagement in the group. When prompted she identified that journaling is something she \"hates\" but she did engage in the activity portion of group and appeared somewhat interested in the more structured journaling techniques offered.         [id number] OT Group (2 or more in attendance)      Patient Active Problem List   Diagnosis    Juvenile idiopathic scoliosis, unspecified spinal region    Raynaud's phenomenon without gangrene    Psychosis (H)    Suicidal ideation    Hallucinations    Psychosis, atypical (H)       "

## 2024-10-24 PROCEDURE — H2032 ACTIVITY THERAPY, PER 15 MIN: HCPCS

## 2024-10-24 PROCEDURE — 128N000002 HC R&B CD/MH ADOLESCENT

## 2024-10-24 PROCEDURE — 250N000013 HC RX MED GY IP 250 OP 250 PS 637: Performed by: NURSE PRACTITIONER

## 2024-10-24 PROCEDURE — 250N000013 HC RX MED GY IP 250 OP 250 PS 637: Performed by: REGISTERED NURSE

## 2024-10-24 PROCEDURE — 97150 GROUP THERAPEUTIC PROCEDURES: CPT | Mod: GO

## 2024-10-24 PROCEDURE — 99232 SBSQ HOSP IP/OBS MODERATE 35: CPT | Performed by: NURSE PRACTITIONER

## 2024-10-24 RX ADMIN — Medication 15 MG: at 08:16

## 2024-10-24 RX ADMIN — TRAZODONE HYDROCHLORIDE 100 MG: 100 TABLET ORAL at 20:31

## 2024-10-24 RX ADMIN — GABAPENTIN 100 MG: 100 CAPSULE ORAL at 08:16

## 2024-10-24 RX ADMIN — POLYETHYLENE GLYCOL 3350 17 G: 17 POWDER, FOR SOLUTION ORAL at 20:31

## 2024-10-24 RX ADMIN — GABAPENTIN 400 MG: 300 CAPSULE ORAL at 20:31

## 2024-10-24 ASSESSMENT — ACTIVITIES OF DAILY LIVING (ADL)
ADLS_ACUITY_SCORE: 0
HYGIENE/GROOMING: INDEPENDENT
ADLS_ACUITY_SCORE: 0
LAUNDRY: WITH SUPERVISION
ADLS_ACUITY_SCORE: 0
DRESS: STREET CLOTHES
ADLS_ACUITY_SCORE: 0
ORAL_HYGIENE: INDEPENDENT
ADLS_ACUITY_SCORE: 0

## 2024-10-24 NOTE — PROGRESS NOTES
CLINICAL NUTRITION SERVICES - REASSESSMENT NOTE     Nutrition Prescription    RECOMMENDATIONS FOR MDs/PROVIDERS TO ORDER:  None at this time    Malnutrition Status:    Patient does not meet two of the established criteria necessary for diagnosing malnutrition    Recommendations already ordered by Registered Dietitian (RD):  -Gatorade w/ meals    Future/Additional Recommendations:  RD to sign off at this time, but will remain available by consult if new nutrition problem arises.       EVALUATION OF THE PROGRESS TOWARD GOALS   Diet: Regular  Supplement: none  Intake: good appetite, eating and drinking adequately per RN notes     NEW FINDINGS   RD met with Sparkle in the milieu who reports she is eating and hydrating well, no concerns at this time r/t ED. Writer offered Gatorade with meals d/t hypotension, pt is agreeable to this.     Labs:  Reviewed     Medications:  Abilify, miralax, atarax prn    GI: Pt denied N/V/D/C     Weights:  Wt Readings from Last 15 Encounters:   10/13/24 62.9 kg (138 lb 11.2 oz)   10/12/24 59.8 kg (131 lb 12.8 oz)      10/11/24 64 kg (141 lb)   09/02/24 65.3 kg (144 lb)   09/06/17 64.4 kg (141 lb 15.6 oz) (83%, Z= 0.97)*   08/18/16 64.8 kg (142 lb 12.8 oz) (87%, Z= 1.14)*   04/08/13 41.5 kg (91 lb 8 oz) (67%, Z= 0.45)*     * Growth percentiles are based on CDC (Girls, 2-20 Years) data.   No weight loss noted    MALNUTRITION  % Intake: No decreased intake noted  % Weight Loss: None noted  Subcutaneous Fat Loss: None observed  Muscle Loss: None observed  Fluid Accumulation/Edema: None noted  Malnutrition Diagnosis: Patient does not meet two of the established criteria necessary for diagnosing malnutrition    Previous Goals   Patient to consume % of nutritionally adequate meal trays TID, or the equivalent with supplements/snacks.  Evaluation: Met    Previous Nutrition Diagnosis  Predicted inadequate nutrient intake (kcal/protein) related to hx of eating disorder and doing outpatient  treatment.    Evaluation: Resolved    CURRENT NUTRITION DIAGNOSIS  No nutrition diagnosis at this time     INTERVENTIONS  Implementation  Modify composition of meals/snacks  Nutrition education for recommended modifications    Goals  Patient to consume % of nutritionally adequate meal trays TID, or the equivalent with supplements/snacks.    Monitoring/Evaluation  RD to sign off at this time, but will remain available by consult if new nutrition problem arises.      Alma Ramirez MPH, RDN, LD  Behavioral Health Adult & Pediatric Dietitian  [facility] Clinical Dietitian [place], Teams, or Desk: [phone]  Weekend/Holiday Vocera: Weekend Holiday Clinical Dietitian [Multi Site Groups]

## 2024-10-24 NOTE — PROGRESS NOTES
[facility], [place]   Psychiatric Progress Note        Interim History:   Team meeting report: The patient's care was discussed with the treatment team during the daily team meeting and/or staff's chart notes were reviewed.  The patient is calm, pleasant, cooperative.  She is visible in the milieu and attending groups.  Appropriately interacting with patients and staff.  She reported improvement in depression and anxiety.  Continues to report some psychotic symptoms.  She took a short nap in the afternoon and evening.  She was seen in the lounge socializing with others.  Denied suicidal and homicidal ideation, self-injury behaviors.  Ate all of her meals.  Med compliant.  Slept through the night.    Met with patient.  She is doing fairly well.  Reports that she had a hard time falling asleep but was able to stay asleep.  She reports feeling somewhat anxious due to her blood pressure being low and feeling dizzy.  We discussed adding some salt to either her water or food and increase fluid intake.  The patient reports minimal auditory visual hallucinations.  She is adamantly denying suicidal ideation and self-injury behaviors.  Depression and anxiety have improved significantly.  She still wants me to talk to her sister tomorrow and go over her testing which I will do.  We discussed the interview tomorrow for IRTS.  She worries about it.  She wants to discharge as soon as possible after that.  No other questions or concerns.         Medications:     Current Facility-Administered Medications   Medication Dose Route Frequency Provider Last Rate Last Admin    ARIPiprazole (ABILIFY) half-tab 15 mg  15 mg Oral Daily Robby Soriano APRN CNP   15 mg at 10/24/24 0816    gabapentin (NEURONTIN) capsule 100 mg  100 mg Oral QAM Robby Soriano APRN CNP   100 mg at 10/24/24 0816    gabapentin (NEURONTIN) capsule 400 mg  400 mg Oral At Bedtime Robby Soriano  APRN CNP   400 mg at 10/23/24 1919    polyethylene glycol (MIRALAX) Packet 17 g  17 g Oral At Bedtime Sandra Mayers APRN CNP   17 g at 10/23/24 1919    traZODone (DESYREL) tablet 100 mg  100 mg Oral At Bedtime Robby Soriano APRN CNP   100 mg at 10/23/24 1919            Allergies:     Allergies   Allergen Reactions    Cats     Seasonal Allergies             Labs:     Recent Results (from the past 4 weeks)   HCG qualitative urine    Collection Time: 10/11/24  9:22 AM   Result Value Ref Range    hCG Urine Qualitative Negative Negative   Urine Drug Screen Panel    Collection Time: 10/11/24  9:22 AM   Result Value Ref Range    Amphetamines Urine Screen Negative Screen Negative    Barbituates Urine Screen Negative Screen Negative    Benzodiazepine Urine Screen Negative Screen Negative    Cannabinoids Urine Screen Negative Screen Negative    Cocaine Urine Screen Negative Screen Negative    Fentanyl Qual Urine Screen Negative Screen Negative    Opiates Urine Screen Negative Screen Negative    PCP Urine Screen Negative Screen Negative   Asymptomatic COVID-19 Virus (Coronavirus) by PCR Nose    Collection Time: 10/11/24  6:16 PM    Specimen: Nose; Swab   Result Value Ref Range    SARS CoV2 PCR Negative Negative   EKG 12-lead, tracing only    Collection Time: 10/12/24 10:32 AM   Result Value Ref Range    Systolic Blood Pressure  mmHg    Diastolic Blood Pressure  mmHg    Ventricular Rate 57 BPM    Atrial Rate 57 BPM    ND Interval 144 ms    QRS Duration 94 ms     ms    QTc 399 ms    P Axis 32 degrees    R AXIS 79 degrees    T Axis 37 degrees    Interpretation ECG       Sinus bradycardia  Otherwise normal ECG  No previous ECGs available  Confirmed by MD VIVIAN, LANETTE ([id number]) on 10/13/2024 10:23:47 AM     Vitamin D Deficiency    Collection Time: 10/12/24 11:21 AM   Result Value Ref Range    Vitamin D, Total (25-Hydroxy) 27 20 - 50 ng/mL   Hemoglobin A1c    Collection Time: 10/12/24 11:21 AM   Result  Value Ref Range    Estimated Average Glucose 80 <117 mg/dL    Hemoglobin A1C 4.4 <5.7 %   Folate    Collection Time: 10/12/24 11:21 AM   Result Value Ref Range    Folic Acid 11.6 4.6 - 34.8 ng/mL   Vitamin B12    Collection Time: 10/12/24 11:21 AM   Result Value Ref Range    Vitamin B12 414 232 - 1,245 pg/mL   Comprehensive metabolic panel    Collection Time: 10/12/24 11:21 AM   Result Value Ref Range    Sodium 139 135 - 145 mmol/L    Potassium 4.1 3.4 - 5.3 mmol/L    Carbon Dioxide (CO2) 23 22 - 29 mmol/L    Anion Gap 11 7 - 15 mmol/L    Urea Nitrogen 8.2 6.0 - 20.0 mg/dL    Creatinine 0.68 0.51 - 0.95 mg/dL    GFR Estimate >90 >60 mL/min/1.73m2    Calcium 9.5 8.8 - 10.4 mg/dL    Chloride 105 98 - 107 mmol/L    Glucose 88 70 - 99 mg/dL    Alkaline Phosphatase 43 40 - 150 U/L    AST 20 0 - 45 U/L    ALT 6 0 - 50 U/L    Protein Total 7.0 6.4 - 8.3 g/dL    Albumin 4.3 3.5 - 5.2 g/dL    Bilirubin Total 0.6 <=1.2 mg/dL   CBC with platelets and differential    Collection Time: 10/12/24 11:21 AM   Result Value Ref Range    WBC Count 6.1 4.0 - 11.0 10e3/uL    RBC Count 4.03 3.80 - 5.20 10e6/uL    Hemoglobin 12.2 11.7 - 15.7 g/dL    Hematocrit 37.2 35.0 - 47.0 %    MCV 92 78 - 100 fL    MCH 30.3 26.5 - 33.0 pg    MCHC 32.8 31.5 - 36.5 g/dL    RDW 12.3 10.0 - 15.0 %    Platelet Count 216 150 - 450 10e3/uL    % Neutrophils 62 %    % Lymphocytes 30 %    % Monocytes 6 %    % Eosinophils 1 %    % Basophils 1 %    % Immature Granulocytes 0 %    NRBCs per 100 WBC 0 <1 /100    Absolute Neutrophils 3.8 1.6 - 8.3 10e3/uL    Absolute Lymphocytes 1.8 0.8 - 5.3 10e3/uL    Absolute Monocytes 0.4 0.0 - 1.3 10e3/uL    Absolute Eosinophils 0.1 0.0 - 0.7 10e3/uL    Absolute Basophils 0.1 0.0 - 0.2 10e3/uL    Absolute Immature Granulocytes 0.0 <=0.4 10e3/uL    Absolute NRBCs 0.0 10e3/uL   Lipid panel reflex to direct LDL    Collection Time: 10/14/24  7:59 AM   Result Value Ref Range    Cholesterol 136 <200 mg/dL    Triglycerides 50 <150 mg/dL     Direct Measure HDL 57 >=50 mg/dL    LDL Cholesterol Calculated 69 <100 mg/dL    Non HDL Cholesterol 79 <130 mg/dL            Precautions:     Behavioral Orders   Procedures    Code 1 - Restrict to Unit    [place]    MMPI 2    Routine Programming     As clinically indicated    Self Injury Precaution    Status 15     Every 15 minutes.    Suicide precautions: Suicide Risk: LOW; Clinical rationale to override score: modification to the care environment     Patients on Suicide Precautions should have a Combination Diet ordered that includes a Diet selection(s) AND a Behavioral Tray selection for Safe Tray - with utensils, or Safe Tray - NO utensils       Order Specific Question:   Suicide Risk     Answer:   LOW     Order Specific Question:   Clinical rationale to override score:     Answer:   modification to the care environment            Psychiatric Examination:   Temp: 97.3  F (36.3  C) Temp src: Temporal BP: 108/73 Pulse: 62     SpO2: 100 % O2 Device: None (Room air)    Weight is 138 lbs 11.2 oz  Body mass index is 21.09 kg/m .    Appearance: awake, alert and adequately groomed  Attitude:  cooperative  Eye Contact:  fair  Mood:  anxious, depressed, and better  Affect:  mood congruent  Speech:  dysarthria  Psychomotor Behavior:  no evidence of tardive dyskinesia, dystonia, or tics  Throught Process:  logical and goal oriented  Associations:  no loose associations  Thought Content:  no evidence of suicidal ideation or homicidal ideation and reports auditory hallucination and a feeling that somebody is behind her at all times.  Insight:  fair  Judgement:  fair  Oriented to:  time, person, and place  Attention Span and Concentration:  intact  Recent and Remote Memory:  intact           Precautions:     Behavioral Orders   Procedures    Code 1 - Restrict to Unit    [place]    MMPI 2    Routine Programming     As clinically indicated    Self Injury Precaution    Status 15     Every 15 minutes.    Suicide precautions:  Suicide Risk: LOW; Clinical rationale to override score: modification to the care environment     Patients on Suicide Precautions should have a Combination Diet ordered that includes a Diet selection(s) AND a Behavioral Tray selection for Safe Tray - with utensils, or Safe Tray - NO utensils       Order Specific Question:   Suicide Risk     Answer:   LOW     Order Specific Question:   Clinical rationale to override score:     Answer:   modification to the care environment          DIagnoses:   Unspecified mood disorder with psychosis versus schizophrenia spectrum disorder  Unspecified anxiety disorder  Autism spectrum disorder, by history  Eating disorder, by history  Trauma history, rule out PTSD  Rule out cluster B traits         Plan:   Medications:  -- Risperidone was discontinued in the emergency room  -- Abilify, 15 mg every morning for psychosis and mood stabilization  -- Gabapentin 100 mg every morning. Increase bedtime dose to 400 mg for sleep  -- Trazodone, 100 mg at bedtime.  -- Additional medications include propranolol, Zyprexa, melatonin, hydroxyzine    Medical:  --Internal medicine to follow up for medical problems   --Blood work was reviewed and is within normal limits.  --EKG is WNL    Consults:   --Care was coordinated with the treatment team.   --The patient was consulted on nature of illness and treatment options.      Disposition Plan   Reason for ongoing admission: is unable to care for self due to severe psychosis or humberto  Discharge location: IRTS facility  Discharge Medications: not ordered  Follow-up Appointments: not scheduled  Legal Status: voluntary   Discharge will be granted once symptoms improved.    Referral Status  Int[facility]idential Treatment Services (IRTS):   [facility]: Sent 10/15  [facility]: sent 10/15   [facility]: Sent 10/15  [facility]: Sent 10/15    Robby SANTOS, CNP    This note was created with the help of Dragon dictation system. All  grammatical/typing errors or context distortion are unintentional and inherent to software.

## 2024-10-24 NOTE — PLAN OF CARE
"Problem: Suicide Risk  Goal: Absence of Self-Harm  Outcome: Progressing  Intervention: Assess Risk to Self and Maintain Safety  Recent Flowsheet Documentation  Taken 10/24/2024 1700 by Harika Mejias RN  Self-Harm Prevention: environmental self-harm risks assessed   Goal Outcome Evaluation:    Plan of Care Reviewed With: patient      Pt presents as calm, withdrawn. She spends a lot of time in her room but did come out to socialize with peers later in the shift. She reports feeling fine. She denies all mental health concerns; no SI/SIB, HI, hallucinations. Pt states \"not right now\" when asked about depression and anxiety. She reports feeling ready to discharge to an IRTS. She is looking forward to IRTS interview tomorrow.     Pt took all scheduled evening medications with no issues. She denies pain or related concerns at this time. No prns given.     "

## 2024-10-24 NOTE — PLAN OF CARE
Rehab Group    Start time: 1115  End time: 1205  Patient time total: 45 minutes    attended full group    #7 attended   Group Type: occupational therapy   Group Topic Covered: balanced lifestyle, cognitive activities, coping skills, healthy leisure time, problem solving, and social skills       Group Session Detail:  OT CLINIC     Patient Response/Contribution:  cooperative with task, organized, actively engaged, Limited eye contact, and did not share thoughts verbally       Patient Detail:    Occupational therapy clinic to facilitate coping skill exploration, creative expression within personally meaningful activities, and clinical observation of social, cognitive, and kinesthetic performance skills. Pt response: Pt presented with blunted, flat affect. IND to initiate, gather materials, sequence, and adjust to workspace demands as needed for a familiar creative expressive task. Demonstrated consistent performance and social skills as observed on previous dates.        [id number] OT Group (2 or more in attendance)      Patient Active Problem List   Diagnosis    Juvenile idiopathic scoliosis, unspecified spinal region    Raynaud's phenomenon without gangrene    Psychosis (H)    Suicidal ideation    Hallucinations    Psychosis, atypical (H)

## 2024-10-24 NOTE — PLAN OF CARE
Problem: Sleep Disturbance  Goal: Adequate Sleep/Rest  Description: Pt will sleep 6-8 hours nightly as evidenced by practicing regular routine of sleep hygiene; inform night staff of not sleeping and timely discuss concerns with provider to identify concerns.  10/24/2024 0603 by Nisha Marrero, RN  Outcome: Progressing  10/24/2024 0602 by Nisha Marrero, RN  Outcome: Progressing    Patient slept for 7 hours uninterrupted. Breathing noted even and unlabored. No indication of pain or discomfort. Safety checks done per routine. No behavior or safety concerns noted at this time. Will continue to monitor.

## 2024-10-24 NOTE — PROGRESS NOTES
"  Rehab Group    Start time: 1015  End time: 1115  Patient time total: 60 minutes    attended full group    #8 attended   Group Type: art   Group Topic Covered: activity therapy       Group Session Detail:  Art Therapy directive was a group collaboration \"carousel drawing\" directive in which pts contributed to each group drawing.  Goals of directive: social interest, to assess how individual functions within a group dynamic, emotional expression, emotional regulation, media exploration.     Patient Response/Contribution:  cooperative with task       Patient Detail:    Pt was a quiet participant, focused on task for the full duration of group. Pt contributed positively to each group drawing. Pt was observed with depressed mood, flat/blunted affect.      Activity Therapy Per 15 min ()      Patient Active Problem List   Diagnosis    Juvenile idiopathic scoliosis, unspecified spinal region    Raynaud's phenomenon without gangrene    Psychosis (H)    Suicidal ideation    Hallucinations    Psychosis, atypical (H)      "

## 2024-10-24 NOTE — PLAN OF CARE
"  Problem: Sleep Disturbance  Goal: Adequate Sleep/Rest  Description: Pt will sleep 6-8 hours nightly as evidenced by practicing regular routine of sleep hygiene; inform night staff of not sleeping and timely discuss concerns with provider to identify concerns.  Outcome: Progressing   Goal Outcome Evaluation:    Plan of Care Reviewed With: patient           Mental Health:  Pt presents to [facility], actively going about her own ADL's, prefers to eat breakfast in her room and returns sound machine to desk independently as directed yesterday, \"it was helpful\" pt states, voice is not as quiet, pt elaborates more stating \"the voice is quieter,\" \"pretty much gone\" when asked about SI thoughts-continues to contract for safety; \"the afternoons when everyone leaves and less activity is more difficult,\" writer explores interests and options to fill the void in the afternoon. Pt likes lavender aromatherapy and to jonathan; \"I only know one card game,\" and reports difficulty concentrating to read, but she keeps trying while in hospital; pt agrees to do activity in afternoon-agrees to timely approach staff if struggling. initiates her own medications-no observed or reported side effects; asks questions about gabapentin-all questions answered and states she has her menses: feminine hygiene products given upon request.Pt more trusting and comfortable in milieu: observed socializing 1:1 with other peers, more visible in [facility] and attends groups; cooperative and respectful of boundaries-no unsafe behaviors.    Medical:  Pt denies any physical pain; appetite and sleep are slowly improving.     Vitals:   /73 (BP Location: Right arm, Patient Position: Sitting, Cuff Size: Adult Regular)   Pulse 62   Temp 97.3  F (36.3  C) (Temporal)   Resp 16   Ht 1.727 m (5' 8\")   Wt 62.9 kg (138 lb 11.2 oz)   LMP 08/07/2024   SpO2 100%   BMI 21.09 kg/m         PRNs Given:  None    Continue to assess and monitor closely, self injury and " suicide precautions for safety.

## 2024-10-24 NOTE — PLAN OF CARE
Rehab Group    Start time: 1415  End time: 1505  Patient time total: 50 minutes    attended full group    #8 attended   Group Type: occupational therapy   Group Topic Covered: balanced lifestyle, coping skills, emotional regulation, mindfulness, problem solving, relationship skills/support systems, and self-care       Group Session Detail:  Mental Health Management: Goal Setting & Discharge Planning     Patient Response/Contribution:  cooperative with task, organized, expressed understanding of topic, worked intermittently, and required prompts or assistance to participate       Patient Detail:    Mental health management group designed to promote insight, self-reflection, self-esteem and goal-setting. Pt Response: Pt was engaged and able to follow and complete the 2-part activity independently. Pt contributed reflections to the discussion. She identified that her sister will be her main support upon discharge but recognizes that her sister lives a busy life and may not be able to available to help at all times. Pt was semi-guarded when sharing though this may also be affected by hyperverbal peers. She appears to benefit when she has specific prompts.         [id number] OT Group (2 or more in attendance)      Patient Active Problem List   Diagnosis    Juvenile idiopathic scoliosis, unspecified spinal region    Raynaud's phenomenon without gangrene    Psychosis (H)    Suicidal ideation    Hallucinations    Psychosis, atypical (H)

## 2024-10-24 NOTE — PLAN OF CARE
Team Note Due:  Monday  Assessment/Intervention/Current Symptoms and Care Coordination  Chart review and met with team, discussed pt progress, symptomology, and response to treatment.  Discussed the discharge plan and any potential impediments to discharge.    CTC sent secured email to [facility] to inquire about referring pt for services.      CTC completed acuity rating.      Discharge Plan or Goal  Pending stabilization & development of a safe discharge plan.    Dispo Plan:Intensive Residential Treatment Services (IRTS):    Medications ordered/sent to:   Provisional Discharge required: NO    Barriers to Discharge   Patient requires further psychiatric stabilization due to current symptomology    Referral Status  Intensive Residential Treatment Services (IRTS):   [facility]: Sent 10/15  [facility]: sent 10/15; Follow up week of 10/21; Interview 10/ 25 @ 1pm  [facility]: Sent 10/15  [facility]: Sent 10/15; Follow up week of 10/21  Legal Status  Patient is voluntary        Contacts:  MAHNAZ signed 10/12/24 Dulce Menjivar (sister) [phone]     MAHNAZ signed for Yaz Pimentel () [phone]; [email or web address]     [facility]:  [phone]  [facility]: [email or web address]  [facility]: [phone];      Upcoming Meetings and Dates/Important Information and next steps:  CTC will connect with [facility] to coordinate meeting.

## 2024-10-25 PROCEDURE — 99232 SBSQ HOSP IP/OBS MODERATE 35: CPT | Performed by: NURSE PRACTITIONER

## 2024-10-25 PROCEDURE — 128N000002 HC R&B CD/MH ADOLESCENT

## 2024-10-25 PROCEDURE — H2032 ACTIVITY THERAPY, PER 15 MIN: HCPCS

## 2024-10-25 PROCEDURE — 250N000013 HC RX MED GY IP 250 OP 250 PS 637: Performed by: REGISTERED NURSE

## 2024-10-25 PROCEDURE — 250N000013 HC RX MED GY IP 250 OP 250 PS 637: Performed by: NURSE PRACTITIONER

## 2024-10-25 RX ADMIN — Medication 15 MG: at 08:12

## 2024-10-25 RX ADMIN — POLYETHYLENE GLYCOL 3350 17 G: 17 POWDER, FOR SOLUTION ORAL at 21:27

## 2024-10-25 RX ADMIN — TRAZODONE HYDROCHLORIDE 100 MG: 100 TABLET ORAL at 21:28

## 2024-10-25 RX ADMIN — GABAPENTIN 400 MG: 300 CAPSULE ORAL at 21:28

## 2024-10-25 RX ADMIN — GABAPENTIN 100 MG: 100 CAPSULE ORAL at 08:12

## 2024-10-25 ASSESSMENT — ACTIVITIES OF DAILY LIVING (ADL)
ADLS_ACUITY_SCORE: 0
ADLS_ACUITY_SCORE: 0
LAUNDRY: WITH SUPERVISION
ADLS_ACUITY_SCORE: 0
ORAL_HYGIENE: INDEPENDENT
ADLS_ACUITY_SCORE: 0
DRESS: STREET CLOTHES
HYGIENE/GROOMING: INDEPENDENT
ADLS_ACUITY_SCORE: 0

## 2024-10-25 NOTE — PLAN OF CARE
Problem: Suicide Risk  Goal: Absence of Self-Harm  Outcome: Progressing   Goal Outcome Evaluation:    Patient slept for 7 hours last night.  Breathing noted even and unlabored. Offers no indication of pain or discomfort. Safety precautions in progress with no occurrence. No behavioral concerns at this time. Will continue to monitor.

## 2024-10-25 NOTE — PLAN OF CARE
BEH IP Unit Acuity Rating Score (UARS)  Patient is given one point for every criteria they meet.    CRITERIA SCORING   On a 72 hour hold, court hold, committed, stay of commitment, or revocation. 0    Patient LOS on BEH unit exceeds 20 days. 0  LOS: 13   Patient under guardianship, 55+, otherwise medically complex, or under age 11. 0   Suicide ideation without relief of precipitating factors. 1   Current plan for suicide. 0   Current plan for homicide. 0   Imminent risk or actual attempt to seriously harm another without relief of factors precipitating the attempt. 0   Severe dysfunction in daily living (ex: complete neglect for self care, extreme disruption in vegetative function, extreme deterioration in social interactions). 1   Recent (last 7 days) or current physical aggression in the ED or on unit. 0   Restraints or seclusion episode in past 72 hours. 0   Recent (last 7 days) or current verbal aggression, agitation, yelling, etc., while in the ED or unit. 0   Active psychosis. 0   Need for constant or near constant redirection (from leaving, from others, etc).  0   Intrusive or disruptive behaviors. 0   Patient requires 3 or more hours of individualized nursing care per 8-hour shift (i.e. for ADLs, meds, therapeutic interventions). 0   TOTAL 2

## 2024-10-25 NOTE — PLAN OF CARE
"Goal Outcome Evaluation:      Problem: Psychotic Symptoms  Goal: Psychotic Symptoms  Description: Signs and symptoms of listed problems will be absent or manageable.  Outcome: Progressing     Pt was up early, active  in the milieu.  Pt was alert and oriented x 4, pleasant and cooperative with staff. VS stable. Affect is mood congruent. Pt reports okay appetite, ate about 100 % of dinner with adequate fluid intake     Pt checked in as doing okay, rated anxiety at 2 and depression at 0  with 10 being worst. Pt rated overall mood as better.  Pt denies SI/SIB/HI. Denies visual and auditory hallucinations.     Pt was medication compliant, denied pain, medication side effects and medical concerns.    /68 (BP Location: Right arm, Patient Position: Sitting, Cuff Size: Adult Regular)   Pulse 71   Temp 98  F (36.7  C) (Temporal)   Resp 16   Ht 1.727 m (5' 8\")   Wt 62.9 kg (138 lb 11.2 oz)   LMP 08/07/2024   SpO2 98%   BMI 21.09 kg/m     "

## 2024-10-25 NOTE — PROGRESS NOTES
Rehab Group    Start time: 1015  End time: 1115  Patient time total: 60 minutes    attended full group    #6 attended   Group Type: art   Group Topic Covered: activity therapy       Group Session Detail:  Art Therapy directive was to create art using mindfulness skills/process art after picking a personal intention for the day and/or weekend. Pts could choose begin watercolor and collage/mixed media.  Goals of directive: mindfulness, emotional regulation, emotional expression, media exploration.        Patient Response/Contribution:  cooperative with task       Patient Detail:    Pt was an engaged participant, focused on task for the full duration of group. Pt's mood was depressed, flat/blunted affect, withdrawn. Pt continued to work on collages in the following AT group.      Activity Therapy Per 15 min ()      Patient Active Problem List   Diagnosis    Juvenile idiopathic scoliosis, unspecified spinal region    Raynaud's phenomenon without gangrene    Psychosis (H)    Suicidal ideation    Hallucinations    Psychosis, atypical (H)

## 2024-10-25 NOTE — PLAN OF CARE
Team Note Due:  Monday  Assessment/Intervention/Current Symptoms and Care Coordination  Chart review and met with team, discussed pt progress, symptomology, and response to treatment.  Discussed the discharge plan and any potential impediments to discharge.    [facility] received email from [facility] stating that interview needs to be rescheduled due to interviewer being sick. Requesting to move interview to  10/29 at 10am.    [facility] received a call from [facility] inquiring if pt is still waiting for placement. CTC returned call and left VM stating that pt is still waiting for placement and requesting an update on timeline. [facility] received a call back from [facility] indicating that it will still be about another week until pt is placed.    [facility] updated pt about [facility] interview being rescheduled. [facility] also informed them of call with [facility]. PT inquired if there will be single rooms. [facility] stated that majority of the rooms are single. Will keep pt updated.    [facility] called [facility] to follow up on referral.     CTC completed acuity rating.      Discharge Plan or Goal  Pending stabilization & development of a safe discharge plan.    Dispo Plan:[facility]ensive Residential Treatment Services (IRTS):    Medications ordered/sent to:   Provisional Discharge required: NO    Barriers to Discharge   Patient requires further psychiatric stabilization due to current symptomology    Referral Status  Intensive Residential Treatment Services (IRTS):   [facility]: Sent 10/15  [facility]: sent 10/15; Follow up week of 10/21; Interview 10/ 29 @ 10am  [facility]: Sent 10/15  [facility]: Sent 10/15; Follow up week of 10/21  Legal Status  Patient is voluntary        Contacts:  MAHNAZ signed 10/12/24 Dulce Tiara (sister) [phone]     MAHNAZ signed for Yaz Pimentel () [phone]; [email or web address]     [facility]:  [phone]  [facility]: [email or web address]  [facility]: [phone];      Upcoming Meetings and Dates/Important Information and next steps:  [facility] will connect with [employer] to coordinate meeting.

## 2024-10-25 NOTE — PROGRESS NOTES
Rehab Group    Start time: 1115  End time: 1215  Patient time total: 60 minutes    attended full group    #6 attended   Group Type: art   Group Topic Covered: activity therapy       Group Session Detail:  Art Therapy directive was to create art using mindfulness skills/process art after picking a personal intention for the day and/or weekend. Pts could choose begin watercolor and collage/mixed media.  Goals of directive: mindfulness, emotional regulation, emotional expression, media exploration.        Patient Response/Contribution:  cooperative with task       Patient Detail:    Pt was an engaged participant, focused on task for the full duration of group. Pt continued to work on 3 personal intention/expressive collages in the following AT group.  Pts mood was calm, flat/blunted affect, withdrawn.      Activity Therapy Per 15 min ()      Patient Active Problem List   Diagnosis    Juvenile idiopathic scoliosis, unspecified spinal region    Raynaud's phenomenon without gangrene    Psychosis (H)    Suicidal ideation    Hallucinations    Psychosis, atypical (H)
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
## 2024-10-25 NOTE — PROGRESS NOTES
[facility] [place]   Psychiatric Progress Note        Interim History:   Team meeting report: The patient's care was discussed with the treatment team during the daily team meeting and/or staff's chart notes were reviewed.  Staff reports the patient is calm, pleasant, cooperative.  She is visible in the milieu and attending groups.  She is social with peers and staff.  She has denie feeling depressed or anxious.  Denied suicidal ideation and psychotic symptoms.  She is eating well.  Med compliant.  Slept through the night.  No behavioral issues.    Met with patient.  She is worrying about the interview today.  She was not able to sleep well because of it.  She is not able to concentrate.  She is wondering if she has some type of ADHD.  Reports minimal depression and anxiety.  The auditory and visual hallucinations are minimal as well.  She is adamantly denying suicidal ideation and self-injury behaviors.  The patient wants to discharge soon as possible.  She wants to have some freedom.  No other questions or concerns.    Spoke with patient's Sister Dulce Menjivar phone #[phone].  We went over the psychological testing, the diagnosis and recommendations.  Dulce continues to worry about the patient's being out of her own.  Believes that she will not be able to make decisions for herself.  We discussed obtaining a guardianship but she declined.  She is aware that the court can appointed a guardian that is not related to the family.  Ms. Menjivar worries that that the patient will walk out of IRTS facility and harm herself again.  She is wondering if there is anything else that can be done to keep her sister safe.  Discussed court commitment and what they can help with.  She understands that at this point, the patient does not meet criteria's for court commitment.  Reports the patient have told her 2 days ago that she is suicidal.  Assured her that this is not what the patient had  been telling us.  The plan is to discharge to an IRTS and if she fails the next step will be group home.  Discussed patient medications         Medications:     Current Facility-Administered Medications   Medication Dose Route Frequency Provider Last Rate Last Admin    ARIPiprazole (ABILIFY) half-tab 15 mg  15 mg Oral Daily BharatsebasSharla moytyian Ataa, APRN CNP   15 mg at 10/25/24 0812    gabapentin (NEURONTIN) capsule 100 mg  100 mg Oral QAM Bharatsebaschinmay Robby Cloudeva, APRN CNP   100 mg at 10/25/24 0812    gabapentin (NEURONTIN) capsule 400 mg  400 mg Oral At Bedtime Christie, Coreyian Aieva, APRN CNP   400 mg at 10/24/24 2031    polyethylene glycol (MIRALAX) Packet 17 g  17 g Oral At Bedtime Sandra Mayers, APRN CNP   17 g at 10/24/24 2031    traZODone (DESYREL) tablet 100 mg  100 mg Oral At Bedtime Anastasiiaa, Coreyian Aieva, APRN CNP   100 mg at 10/24/24 2031            Allergies:     Allergies   Allergen Reactions    Cats     Seasonal Allergies             Labs:     Recent Results (from the past 4 weeks)   HCG qualitative urine    Collection Time: 10/11/24  9:22 AM   Result Value Ref Range    hCG Urine Qualitative Negative Negative   Urine Drug Screen Panel    Collection Time: 10/11/24  9:22 AM   Result Value Ref Range    Amphetamines Urine Screen Negative Screen Negative    Barbituates Urine Screen Negative Screen Negative    Benzodiazepine Urine Screen Negative Screen Negative    Cannabinoids Urine Screen Negative Screen Negative    Cocaine Urine Screen Negative Screen Negative    Fentanyl Qual Urine Screen Negative Screen Negative    Opiates Urine Screen Negative Screen Negative    PCP Urine Screen Negative Screen Negative   Asymptomatic COVID-19 Virus (Coronavirus) by PCR Nose    Collection Time: 10/11/24  6:16 PM    Specimen: Nose; Swab   Result Value Ref Range    SARS CoV2 PCR Negative Negative   EKG 12-lead, tracing only    Collection Time: 10/12/24 10:32 AM   Result Value Ref Range     Systolic Blood Pressure  mmHg    Diastolic Blood Pressure  mmHg    Ventricular Rate 57 BPM    Atrial Rate 57 BPM    OR Interval 144 ms    QRS Duration 94 ms     ms    QTc 399 ms    P Axis 32 degrees    R AXIS 79 degrees    T Axis 37 degrees    Interpretation ECG       Sinus bradycardia  Otherwise normal ECG  No previous ECGs available  Confirmed by MD VIVIAN, LANETTE ([id number]) on 10/13/2024 10:23:47 AM     Vitamin D Deficiency    Collection Time: 10/12/24 11:21 AM   Result Value Ref Range    Vitamin D, Total (25-Hydroxy) 27 20 - 50 ng/mL   Hemoglobin A1c    Collection Time: 10/12/24 11:21 AM   Result Value Ref Range    Estimated Average Glucose 80 <117 mg/dL    Hemoglobin A1C 4.4 <5.7 %   Folate    Collection Time: 10/12/24 11:21 AM   Result Value Ref Range    Folic Acid 11.6 4.6 - 34.8 ng/mL   Vitamin B12    Collection Time: 10/12/24 11:21 AM   Result Value Ref Range    Vitamin B12 414 232 - 1,245 pg/mL   Comprehensive metabolic panel    Collection Time: 10/12/24 11:21 AM   Result Value Ref Range    Sodium 139 135 - 145 mmol/L    Potassium 4.1 3.4 - 5.3 mmol/L    Carbon Dioxide (CO2) 23 22 - 29 mmol/L    Anion Gap 11 7 - 15 mmol/L    Urea Nitrogen 8.2 6.0 - 20.0 mg/dL    Creatinine 0.68 0.51 - 0.95 mg/dL    GFR Estimate >90 >60 mL/min/1.73m2    Calcium 9.5 8.8 - 10.4 mg/dL    Chloride 105 98 - 107 mmol/L    Glucose 88 70 - 99 mg/dL    Alkaline Phosphatase 43 40 - 150 U/L    AST 20 0 - 45 U/L    ALT 6 0 - 50 U/L    Protein Total 7.0 6.4 - 8.3 g/dL    Albumin 4.3 3.5 - 5.2 g/dL    Bilirubin Total 0.6 <=1.2 mg/dL   CBC with platelets and differential    Collection Time: 10/12/24 11:21 AM   Result Value Ref Range    WBC Count 6.1 4.0 - 11.0 10e3/uL    RBC Count 4.03 3.80 - 5.20 10e6/uL    Hemoglobin 12.2 11.7 - 15.7 g/dL    Hematocrit 37.2 35.0 - 47.0 %    MCV 92 78 - 100 fL    MCH 30.3 26.5 - 33.0 pg    MCHC 32.8 31.5 - 36.5 g/dL    RDW 12.3 10.0 - 15.0 %    Platelet Count 216 150 - 450 10e3/uL    % Neutrophils 62 %     % Lymphocytes 30 %    % Monocytes 6 %    % Eosinophils 1 %    % Basophils 1 %    % Immature Granulocytes 0 %    NRBCs per 100 WBC 0 <1 /100    Absolute Neutrophils 3.8 1.6 - 8.3 10e3/uL    Absolute Lymphocytes 1.8 0.8 - 5.3 10e3/uL    Absolute Monocytes 0.4 0.0 - 1.3 10e3/uL    Absolute Eosinophils 0.1 0.0 - 0.7 10e3/uL    Absolute Basophils 0.1 0.0 - 0.2 10e3/uL    Absolute Immature Granulocytes 0.0 <=0.4 10e3/uL    Absolute NRBCs 0.0 10e3/uL   Lipid panel reflex to direct LDL    Collection Time: 10/14/24  7:59 AM   Result Value Ref Range    Cholesterol 136 <200 mg/dL    Triglycerides 50 <150 mg/dL    Direct Measure HDL 57 >=50 mg/dL    LDL Cholesterol Calculated 69 <100 mg/dL    Non HDL Cholesterol 79 <130 mg/dL            Precautions:     Behavioral Orders   Procedures    Code 1 - Restrict to Unit    Trunk ShowI 2    Routine Programming     As clinically indicated    Self Injury Precaution    Status 15     Every 15 minutes.    Suicide precautions: Suicide Risk: LOW; Clinical rationale to override score: modification to the care environment     Patients on Suicide Precautions should have a Combination Diet ordered that includes a Diet selection(s) AND a Behavioral Tray selection for Safe Tray - with utensils, or Safe Tray - NO utensils       Order Specific Question:   Suicide Risk     Answer:   LOW     Order Specific Question:   Clinical rationale to override score:     Answer:   modification to the care environment            Psychiatric Examination:                      Weight is 138 lbs 11.2 oz  Body mass index is 21.09 kg/m .    Appearance: awake, alert and adequately groomed  Attitude:  cooperative  Eye Contact:  fair  Mood:  anxious, depressed, and better  Affect:  mood congruent  Speech:  dysarthria  Psychomotor Behavior:  no evidence of tardive dyskinesia, dystonia, or tics  Throught Process:  logical and goal oriented  Associations:  no loose associations  Thought Content:  no evidence of suicidal  ideation or homicidal ideation and reports auditory hallucination and a feeling that somebody is behind her at all times.  Insight:  fair  Judgement:  fair  Oriented to:  time, person, and place  Attention Span and Concentration:  intact  Recent and Remote Memory:  intact           Precautions:     Behavioral Orders   Procedures    Code 1 - Restrict to Unit    Karla    MMPI 2    Routine Programming     As clinically indicated    Self Injury Precaution    Status 15     Every 15 minutes.    Suicide precautions: Suicide Risk: LOW; Clinical rationale to override score: modification to the care environment     Patients on Suicide Precautions should have a Combination Diet ordered that includes a Diet selection(s) AND a Behavioral Tray selection for Safe Tray - with utensils, or Safe Tray - NO utensils       Order Specific Question:   Suicide Risk     Answer:   LOW     Order Specific Question:   Clinical rationale to override score:     Answer:   modification to the care environment          DIagnoses:   Unspecified mood disorder with psychosis versus schizophrenia spectrum disorder  Unspecified anxiety disorder  Autism spectrum disorder, by history  Eating disorder, by history  Trauma history, rule out PTSD  Rule out cluster B traits         Plan:   Medications:  -- Risperidone was discontinued in the emergency room  -- Abilify, 15 mg every morning for psychosis and mood stabilization  -- Gabapentin 100 mg every morning. Increase bedtime dose to 400 mg for sleep  -- Trazodone, 100 mg at bedtime.  -- Additional medications include propranolol, Zyprexa, melatonin, hydroxyzine    Medical:  --Internal medicine to follow up for medical problems   --Blood work was reviewed and is within normal limits.  --EKG is WNL    Consults:   --Care was coordinated with the treatment team.   --The patient was consulted on nature of illness and treatment options.      Disposition Plan   Reason for ongoing admission: is unable to care for  self due to severe psychosis or humberto  Discharge location: IRTS facility  Discharge Medications: not ordered  Follow-up Appointments: not scheduled  Legal Status: voluntary   Discharge will be granted once symptoms improved.    Referral Status  Intensive Residential Treatment Services (IRTS):   [facility]: Sent 10/15  [facility]: sent 10/15   [facility]: Sent 10/15  [facility]: Sent 10/15    Robby SANTOS, CNP    This note was created with the help of Dragon dictation system. All grammatical/typing errors or context distortion are unintentional and inherent to software.

## 2024-10-25 NOTE — PLAN OF CARE
"  Problem: Psychotic Symptoms  Goal: Social and Therapeutic (Psychotic Symptoms)  Description: Pt will remain safe on the unit every shift as evidenced by identify and utilize 3 coping skills, maintain ADL's independently and attend 50% of programming.  Outcome: Progressing   Goal Outcome Evaluation:     Plan of Care Reviewed With: patient       It took a while to fall asleep last night, and spelt well when asleep. Pt med compliant, claims no pain. She endorses anxiety 5/10 d/t \"nervousness\" regarding upcoming IRTS interview today. She denies depression, SI, HI, and hallucinations. She ate 100% of breakfast and paced the hallway. She played cards with peers, read in  her room, attended morning OT. She ate lunch paced the almanza and attended afternoon OT.            "

## 2024-10-26 PROCEDURE — H2032 ACTIVITY THERAPY, PER 15 MIN: HCPCS

## 2024-10-26 PROCEDURE — 250N000013 HC RX MED GY IP 250 OP 250 PS 637: Performed by: REGISTERED NURSE

## 2024-10-26 PROCEDURE — 250N000013 HC RX MED GY IP 250 OP 250 PS 637: Performed by: NURSE PRACTITIONER

## 2024-10-26 PROCEDURE — 128N000002 HC R&B CD/MH ADOLESCENT

## 2024-10-26 RX ADMIN — GABAPENTIN 400 MG: 300 CAPSULE ORAL at 19:44

## 2024-10-26 RX ADMIN — GABAPENTIN 100 MG: 100 CAPSULE ORAL at 08:17

## 2024-10-26 RX ADMIN — Medication 15 MG: at 08:17

## 2024-10-26 RX ADMIN — POLYETHYLENE GLYCOL 3350 17 G: 17 POWDER, FOR SOLUTION ORAL at 19:44

## 2024-10-26 RX ADMIN — TRAZODONE HYDROCHLORIDE 100 MG: 100 TABLET ORAL at 19:44

## 2024-10-26 ASSESSMENT — ACTIVITIES OF DAILY LIVING (ADL)
ORAL_HYGIENE: INDEPENDENT
ADLS_ACUITY_SCORE: 0
ADLS_ACUITY_SCORE: 0
HYGIENE/GROOMING: INDEPENDENT
ADLS_ACUITY_SCORE: 0
DRESS: STREET CLOTHES
ADLS_ACUITY_SCORE: 0
ADLS_ACUITY_SCORE: 0
LAUNDRY: WITH SUPERVISION
ADLS_ACUITY_SCORE: 0

## 2024-10-26 NOTE — PLAN OF CARE
"Problem: Psychotic Symptoms  Goal: Psychotic Symptoms  Description: Signs and symptoms of listed problems will be absent or manageable.  Outcome: Progressing   Goal Outcome Evaluation:   Plan of Care Reviewed With: patient   /75 (BP Location: Right arm, Patient Position: Sitting, Cuff Size: Adult Regular)   Pulse 73   Temp 97.9  F (36.6  C) (Temporal)   Resp 16   Ht 1.727 m (5' 8\")   Wt 62.9 kg (138 lb 11.2 oz)   LMP 08/07/2024   SpO2 100%   BMI 21.09 kg/m      Pt  was notably visible in the lounge engaged in independent activities of coloring, playing cards and chit-chatting with selected peers. Later in the evening she kept to self mostly. She appeared calm, affect was flat and blunted and mood was depressive. Pt denied any pain. Pt denied anxiety, depression,SI/SIB/HI and A/V hallucinations. Pt's appetite is good. She was medication compliant, denied any medication adverse reactions.  "

## 2024-10-26 NOTE — PLAN OF CARE
Problem: Sleep Disturbance  Goal: Adequate Sleep/Rest  Description: Pt will sleep 6-8 hours nightly as evidenced by practicing regular routine of sleep hygiene; inform night staff of not sleeping and timely discuss concerns with provider to identify concerns.  Outcome: Progressing   Goal Outcome Evaluation:    Patient slept for 7 hours during the night. Offers no indication of pain or discomfort. Breathing even and unlabored. Safety precautions in progress with no occurrence. No behavior or safety concerns at this time. Will continue to monitor and provide support.

## 2024-10-26 NOTE — PROGRESS NOTES
Rehab Group    Start time: 2000  End time: 2100  Patient time total: 60 minutes    attended full group    #5 attended   Group Type: art   Group Topic Covered: activity therapy       Group Session Detail:  Art Therapy directive was to create a peaceful place drawing in response to a peaceful/safe place guided imagery relaxation script. Pts were also encouraged to identify five items that represent the five senses in their drawings as well.  Goals of directive:  Relaxation, mindfulness, trauma containment, emotional regulation     Patient Response/Contribution:  cooperative with task       Patient Detail:    Pt was a quiet, engaged participant, focused on task for the full duration of group. Pt finished her peaceful place drawing and chose not to share with author or group today.  Pts mood was calm, pleasant participant.      Activity Therapy Per 15 min ()      Patient Active Problem List   Diagnosis    Juvenile idiopathic scoliosis, unspecified spinal region    Raynaud's phenomenon without gangrene    Psychosis (H)    Suicidal ideation    Hallucinations    Psychosis, atypical (H)

## 2024-10-26 NOTE — PLAN OF CARE
Problem: Adult Behavioral Health Plan of Care  Goal: Optimized Coping Skills in Response to Life Stressors  Outcome: Progressing     Problem: Adult Behavioral Health Plan of Care  Goal: Adheres to Safety Considerations for Self and Others  Intervention: Develop and Maintain Individualized Safety Plan  Recent Flowsheet Documentation  Taken 10/26/2024 1000 by Jonnie Joshi RN  Safety Measures: safety rounds completed   Goal Outcome Evaluation:         Patient had flat affect. Mood was calm and depressed. Was visible in the milieu. Socialized with peers. Verbalized depression 6/10. Denied pain, anxiety, covid 19 symptoms, SI/HI/SIB/AH/VH, and contracted for safety. Was medication compliant and had good appetite. Will continue to monitor patient.

## 2024-10-26 NOTE — PROGRESS NOTES
Rehab Group    Start time: 1315  End time: 1415  Patient time total: 60 minutes    attended full group    #5 attended   Group Type: art   Group Topic Covered: activity therapy       Group Session Detail:  Art Therapy directive was to create art using mindfulness skills/process art after picking a personal intention for the day and/or weekend. Pts could choose begin watercolor and collage/mixed media.  Goals of directive: mindfulness, emotional regulation, emotional expression, media exploration.     Patient Response/Contribution:  cooperative with task       Patient Detail:    Pt was a quiet  participant, focused on task for the full duration of group. Pt presented with flat, blunted affect, depressed mood. Pt created two expressive collages and briefly verbally processed with group. Pt themed one collage around memories of her Grandmother. Pt themed the other collage around difficulties with sleep. Pt said she struggles with falling asleep nightly.      Activity Therapy Per 15 min ()      Patient Active Problem List   Diagnosis    Juvenile idiopathic scoliosis, unspecified spinal region    Raynaud's phenomenon without gangrene    Psychosis (H)    Suicidal ideation    Hallucinations    Psychosis, atypical (H)

## 2024-10-27 PROCEDURE — 128N000002 HC R&B CD/MH ADOLESCENT

## 2024-10-27 PROCEDURE — 250N000013 HC RX MED GY IP 250 OP 250 PS 637: Performed by: NURSE PRACTITIONER

## 2024-10-27 PROCEDURE — 250N000013 HC RX MED GY IP 250 OP 250 PS 637: Performed by: REGISTERED NURSE

## 2024-10-27 RX ADMIN — POLYETHYLENE GLYCOL 3350 17 G: 17 POWDER, FOR SOLUTION ORAL at 19:46

## 2024-10-27 RX ADMIN — TRAZODONE HYDROCHLORIDE 100 MG: 100 TABLET ORAL at 19:46

## 2024-10-27 RX ADMIN — GABAPENTIN 100 MG: 100 CAPSULE ORAL at 08:55

## 2024-10-27 RX ADMIN — GABAPENTIN 400 MG: 300 CAPSULE ORAL at 19:46

## 2024-10-27 RX ADMIN — Medication 15 MG: at 08:54

## 2024-10-27 ASSESSMENT — ACTIVITIES OF DAILY LIVING (ADL)
ADLS_ACUITY_SCORE: 0
HYGIENE/GROOMING: INDEPENDENT
ADLS_ACUITY_SCORE: 0
ORAL_HYGIENE: INDEPENDENT
DRESS: INDEPENDENT
ADLS_ACUITY_SCORE: 0
LAUNDRY: UNABLE TO COMPLETE
ADLS_ACUITY_SCORE: 0

## 2024-10-27 NOTE — PLAN OF CARE
"  Problem: Suicide Risk  Goal: Absence of Self-Harm  Outcome: Progressing  Intervention: Promote Psychosocial Wellbeing  Recent Flowsheet Documentation  Taken 10/27/2024 0900 by Alanis Gutierres RN  Family/Support System Care: self-care encouraged   Goal Outcome Evaluation:     Plan of Care Reviewed With: patient     Pt was visible on the unit, social with both peers and staff. Attended and participated in music and coloring activity. Ate 100% meals and snacks. Was pleasant, cooperative, and med compliant. Reported 3/10 anxiety and depression. Denied all other psych symptoms. No verbal outbursts and no behavioral issues noted. Contracted for safety, Sister came to visit, visit went well. Will continue to monitor.     /64 (BP Location: Right arm, Patient Position: Sitting, Cuff Size: Adult Regular)   Pulse 95   Temp 97.5  F (36.4  C) (Temporal)   Resp 16   Ht 1.727 m (5' 8\")   Wt 63.9 kg (140 lb 12.8 oz)   LMP 08/07/2024   SpO2 100%   BMI 21.41 kg/m            "

## 2024-10-28 PROCEDURE — 250N000013 HC RX MED GY IP 250 OP 250 PS 637: Performed by: CLINICAL NURSE SPECIALIST

## 2024-10-28 PROCEDURE — 250N000013 HC RX MED GY IP 250 OP 250 PS 637: Performed by: REGISTERED NURSE

## 2024-10-28 PROCEDURE — 99232 SBSQ HOSP IP/OBS MODERATE 35: CPT | Performed by: NURSE PRACTITIONER

## 2024-10-28 PROCEDURE — 250N000013 HC RX MED GY IP 250 OP 250 PS 637: Performed by: NURSE PRACTITIONER

## 2024-10-28 PROCEDURE — 97150 GROUP THERAPEUTIC PROCEDURES: CPT | Mod: GO

## 2024-10-28 PROCEDURE — 90853 GROUP PSYCHOTHERAPY: CPT

## 2024-10-28 PROCEDURE — 128N000002 HC R&B CD/MH ADOLESCENT

## 2024-10-28 RX ORDER — ARIPIPRAZOLE 10 MG/1
20 TABLET ORAL DAILY
Status: DISCONTINUED | OUTPATIENT
Start: 2024-10-29 | End: 2024-10-31 | Stop reason: HOSPADM

## 2024-10-28 RX ADMIN — Medication 15 MG: at 08:11

## 2024-10-28 RX ADMIN — POLYETHYLENE GLYCOL 3350 17 G: 17 POWDER, FOR SOLUTION ORAL at 20:07

## 2024-10-28 RX ADMIN — TRAZODONE HYDROCHLORIDE 100 MG: 100 TABLET ORAL at 20:07

## 2024-10-28 RX ADMIN — GABAPENTIN 400 MG: 300 CAPSULE ORAL at 20:07

## 2024-10-28 RX ADMIN — PROPRANOLOL HYDROCHLORIDE 10 MG: 10 TABLET ORAL at 15:00

## 2024-10-28 RX ADMIN — HYDROXYZINE HYDROCHLORIDE 25 MG: 25 TABLET, FILM COATED ORAL at 20:07

## 2024-10-28 RX ADMIN — GABAPENTIN 100 MG: 100 CAPSULE ORAL at 08:11

## 2024-10-28 ASSESSMENT — ACTIVITIES OF DAILY LIVING (ADL)
ADLS_ACUITY_SCORE: 0
LAUNDRY: WITH SUPERVISION
ADLS_ACUITY_SCORE: 0
DRESS: INDEPENDENT
ADLS_ACUITY_SCORE: 0
ADLS_ACUITY_SCORE: 0
ORAL_HYGIENE: INDEPENDENT
ADLS_ACUITY_SCORE: 0
HYGIENE/GROOMING: INDEPENDENT
ADLS_ACUITY_SCORE: 0

## 2024-10-28 NOTE — PROGRESS NOTES
10/28/24 1157   Individualization/Patient Specific Goals   Patient Vulnerabilities family/relationship conflict;history of unsuccessful treatment;lacks insight into illness;poor impulse control;limited social skills   Interprofessional Rounds   Summary There was discussion about discharge plan   Participants advanced practice nurse;nursing;CTC;OT   Behavioral Team Discussion   Participants Nahid Soriano APRN; Dharmesh Joshi RN; Barby COSBY; Natalie Brito CTC   Progress Improving   Anticipated length of stay 3 to 5 days   Continued Stay Criteria/Rationale Si   Medical/Physical See H&P   Precautions See below   Plan Stablize on medications and discharge to IRTS   Rationale for change in precautions or plan No change   Safety Plan Safe secure milieu   Anticipated Discharge Disposition IRTS     PRECAUTIONS AND SAFETY    Behavioral Orders   Procedures    Code 1 - Restrict to Unit    [employer]    MMPI 2    Routine Programming     As clinically indicated    Self Injury Precaution    Status 15     Every 15 minutes.    Suicide precautions: Suicide Risk: LOW; Clinical rationale to override score: modification to the care environment     Patients on Suicide Precautions should have a Combination Diet ordered that includes a Diet selection(s) AND a Behavioral Tray selection for Safe Tray - with utensils, or Safe Tray - NO utensils       Order Specific Question:   Suicide Risk     Answer:   LOW     Order Specific Question:   Clinical rationale to override score:     Answer:   modification to the care environment       Safety  Safety WDL: WDL  Patient Location: patient room, own  Observed Behavior: calm  Observed Behavior (Comment): eating breakfast  Safety Measures: safety rounds completed  Diversional Activity: reading  Suicidality: Status 15

## 2024-10-28 NOTE — PLAN OF CARE
"  Problem: Depressive Symptoms  Goal: Depressive Symptoms  Description: Signs and symptoms of listed problems will be absent or manageable.  Outcome: Progressing   Goal Outcome Evaluation:         Pt was visible intermittently in the milieu, mostly withdrawn to self. Pt stated the propanolol helped some what with her anxiety. Pt denies all other mental health symptoms. Pt stated she wants to discharge tomorrow. Pt is hopeful interview will go well tomorrow and she can discharge. No behavioral or safety concerns. Pt calm and cooperative, medication compliant. Pt requested and received PRN hydroxyzine before sleep. Pt stated feels \"shaky sometimes when I'm trying to go to sleep.\" Will continue to monitor.               "

## 2024-10-28 NOTE — PROGRESS NOTES
Rehab Group    Start time: 1015  End time: 1200  Patient time total: 90 minutes    attended full group    #8 attended   Group Type: occupational therapy   Group Topic Covered: coping skills     Group Session Detail:  OT clinic     Patient Response/Contribution:  cooperative with task, organized, attentive, and actively engaged     Patient Detail:    Pt actively participated in occupational therapy clinic to facilitate coping skill exploration, creative expression within personally meaningful activities, and clinical observation of social, cognitive, and kinesthetic performance skills. Pt response: Independent to initiate, gather materials, sequence, and adjust to workspace demands as needed. Demonstrated good focus, planning, and attention to detail for selected creative expression (collage) task. Shared that she did not select a theme for her collage, and rather was selecting pictures and words/phrases that stood out to her to add to her project. Able to ask for assistance as needed. Minimal interactions with peers observed, as she spent a majority of group quietly focused on her task. Calm and cooperative. Affect appeared restricted.      [id number] OT Group (2 or more in attendance)      Patient Active Problem List   Diagnosis    Juvenile idiopathic scoliosis, unspecified spinal region    Raynaud's phenomenon without gangrene    Psychosis (H)    Suicidal ideation    Hallucinations    Psychosis, atypical (H)

## 2024-10-28 NOTE — PLAN OF CARE
"Goal Outcome Evaluation:    Problem: Depressive Symptoms  Goal: Depressive Symptoms  Description: Signs and symptoms of listed problems will be absent or manageable.  Outcome: Progressing     Pt was up early, active and social in the milieu.  Pt was alert and oriented x 4, pleasant and cooperative with staff. VS stable. Affect is  mood congruent.  Pt reports okay appetite, ate about 100 % of dinner with adequate fluid intake.     Pt checked in as doing okay, rated anxiety at  0  and depression at  5 with 10 being worst. Pt rated overall mood is depressed but declined intervention.  Pt denies SI/SIB/HI. Denies visual and auditory hallucinations.    Pt was medication compliant, denied pain, medication side effects and medical concerns.    /64 (BP Location: Right arm, Patient Position: Sitting, Cuff Size: Adult Regular)   Pulse 95   Temp 97.5  F (36.4  C) (Temporal)   Resp 16   Ht 1.727 m (5' 8\")   Wt 63.9 kg (140 lb 12.8 oz)   LMP 08/07/2024   SpO2 100%   BMI 21.41 kg/m     "

## 2024-10-28 NOTE — PLAN OF CARE
Problem: Sleep Disturbance  Goal: Adequate Sleep/Rest  Description: Pt will sleep 6-8 hours nightly as evidenced by practicing regular routine of sleep hygiene; inform night staff of not sleeping and timely discuss concerns with provider to identify concerns.  Outcome: Progressing   Goal Outcome Evaluation:    Patient slept for 7 hours last night. Breathing noted even and unlabored. Safety precautions in progress with no occurrence. No behavior or safety concerns at this time. Will continue to monitor and provide support.

## 2024-10-28 NOTE — PLAN OF CARE
Group Attendance:  attended partial group    Time session began: 1415  Time session ended: 1500  Patient's total time in group: 30    Total # Attendees   6   Group Type psychotherapeutic     Group Topic Covered emotional regulation, insight improvement, symptom management, and healthy coping skills     Group Session Detail Group members checked in with how they are feeling. The group discussed utilizing music as a therapeutic tool to explore emotions, expression, and promote relaxation. Writer discussed the significance of music in emotional regulation and coping strategies. Group members shared personal experiences and associations with songs they chose.      Patient's response to the group topic/interactions:  cooperative with task, socially appropriate, and listened actively     Patient Details: Pt attended group, was very quiet and reserved. Pt left a few minutes early.            [id number] - Group psychotherapy - 1 Session  Patient Active Problem List   Diagnosis    Juvenile idiopathic scoliosis, unspecified spinal region    Raynaud's phenomenon without gangrene    Psychosis (H)    Suicidal ideation    Hallucinations    Psychosis, atypical (H)

## 2024-10-28 NOTE — PROGRESS NOTES
[facility], [place]   Psychiatric Progress Note        Interim History:   Team meeting report: The patient's care was discussed with the treatment team during the daily team meeting and/or staff's chart notes were reviewed.  Per nursing staff, the patient is calm, pleasant, cooperative.  She was visible in the milieu and social with peers and staff.  She attended all groups.  Ate well.  Med compliant.  In the evening, she was more isolated to her room.  She reported feeling anxious about the interview for placement.  She took propranolol which was helpful.  In the evening, she reported feeling shaky.  No behavioral issues.  Slept through the night.    Met with patient.  She had a 2 hours interview with [facility] facility.  States it went well.  She wished day can take her earlier but she is satisfied with Thursday as well.  The patient feels much calmer now that the interview is over.  The patient reports minimal and intermittent hallucinations.  Adamantly denies suicidal ideation self-injury behaviors.  Mood is better.  Sleep is better.  She is eating all of her meals.  Excited about discharge.  No other questions or concerns.         Medications:     Current Facility-Administered Medications   Medication Dose Route Frequency Provider Last Rate Last Admin    ARIPiprazole (ABILIFY) half-tab 15 mg  15 mg Oral Daily Robby Soriano APRN CNP   15 mg at 10/28/24 0811    gabapentin (NEURONTIN) capsule 100 mg  100 mg Oral QAM Robby Soriano APRN CNP   100 mg at 10/28/24 0811    gabapentin (NEURONTIN) capsule 400 mg  400 mg Oral At Bedtime Robby Soriano APRN CNP   400 mg at 10/27/24 1946    polyethylene glycol (MIRALAX) Packet 17 g  17 g Oral At Bedtime Sandra Mayers APRN CNP   17 g at 10/27/24 1946    traZODone (DESYREL) tablet 100 mg  100 mg Oral At Bedtime Robby Soriano APRN CNP   100 mg at 10/27/24 1946            Allergies:      Allergies   Allergen Reactions    Cats     Seasonal Allergies             Labs:     Recent Results (from the past 4 weeks)   HCG qualitative urine    Collection Time: 10/11/24  9:22 AM   Result Value Ref Range    hCG Urine Qualitative Negative Negative   Urine Drug Screen Panel    Collection Time: 10/11/24  9:22 AM   Result Value Ref Range    Amphetamines Urine Screen Negative Screen Negative    Barbituates Urine Screen Negative Screen Negative    Benzodiazepine Urine Screen Negative Screen Negative    Cannabinoids Urine Screen Negative Screen Negative    Cocaine Urine Screen Negative Screen Negative    Fentanyl Qual Urine Screen Negative Screen Negative    Opiates Urine Screen Negative Screen Negative    PCP Urine Screen Negative Screen Negative   Asymptomatic COVID-19 Virus (Coronavirus) by PCR Nose    Collection Time: 10/11/24  6:16 PM    Specimen: Nose; Swab   Result Value Ref Range    SARS CoV2 PCR Negative Negative   EKG 12-lead, tracing only    Collection Time: 10/12/24 10:32 AM   Result Value Ref Range    Systolic Blood Pressure  mmHg    Diastolic Blood Pressure  mmHg    Ventricular Rate 57 BPM    Atrial Rate 57 BPM    AK Interval 144 ms    QRS Duration 94 ms     ms    QTc 399 ms    P Axis 32 degrees    R AXIS 79 degrees    T Axis 37 degrees    Interpretation ECG       Sinus bradycardia  Otherwise normal ECG  No previous ECGs available  Confirmed by MD VIVIAN, LANETTE ([id number]) on 10/13/2024 10:23:47 AM     Vitamin D Deficiency    Collection Time: 10/12/24 11:21 AM   Result Value Ref Range    Vitamin D, Total (25-Hydroxy) 27 20 - 50 ng/mL   Hemoglobin A1c    Collection Time: 10/12/24 11:21 AM   Result Value Ref Range    Estimated Average Glucose 80 <117 mg/dL    Hemoglobin A1C 4.4 <5.7 %   Folate    Collection Time: 10/12/24 11:21 AM   Result Value Ref Range    Folic Acid 11.6 4.6 - 34.8 ng/mL   Vitamin B12    Collection Time: 10/12/24 11:21 AM   Result Value Ref Range    Vitamin B12 414 232 - 1,245  pg/mL   Comprehensive metabolic panel    Collection Time: 10/12/24 11:21 AM   Result Value Ref Range    Sodium 139 135 - 145 mmol/L    Potassium 4.1 3.4 - 5.3 mmol/L    Carbon Dioxide (CO2) 23 22 - 29 mmol/L    Anion Gap 11 7 - 15 mmol/L    Urea Nitrogen 8.2 6.0 - 20.0 mg/dL    Creatinine 0.68 0.51 - 0.95 mg/dL    GFR Estimate >90 >60 mL/min/1.73m2    Calcium 9.5 8.8 - 10.4 mg/dL    Chloride 105 98 - 107 mmol/L    Glucose 88 70 - 99 mg/dL    Alkaline Phosphatase 43 40 - 150 U/L    AST 20 0 - 45 U/L    ALT 6 0 - 50 U/L    Protein Total 7.0 6.4 - 8.3 g/dL    Albumin 4.3 3.5 - 5.2 g/dL    Bilirubin Total 0.6 <=1.2 mg/dL   CBC with platelets and differential    Collection Time: 10/12/24 11:21 AM   Result Value Ref Range    WBC Count 6.1 4.0 - 11.0 10e3/uL    RBC Count 4.03 3.80 - 5.20 10e6/uL    Hemoglobin 12.2 11.7 - 15.7 g/dL    Hematocrit 37.2 35.0 - 47.0 %    MCV 92 78 - 100 fL    MCH 30.3 26.5 - 33.0 pg    MCHC 32.8 31.5 - 36.5 g/dL    RDW 12.3 10.0 - 15.0 %    Platelet Count 216 150 - 450 10e3/uL    % Neutrophils 62 %    % Lymphocytes 30 %    % Monocytes 6 %    % Eosinophils 1 %    % Basophils 1 %    % Immature Granulocytes 0 %    NRBCs per 100 WBC 0 <1 /100    Absolute Neutrophils 3.8 1.6 - 8.3 10e3/uL    Absolute Lymphocytes 1.8 0.8 - 5.3 10e3/uL    Absolute Monocytes 0.4 0.0 - 1.3 10e3/uL    Absolute Eosinophils 0.1 0.0 - 0.7 10e3/uL    Absolute Basophils 0.1 0.0 - 0.2 10e3/uL    Absolute Immature Granulocytes 0.0 <=0.4 10e3/uL    Absolute NRBCs 0.0 10e3/uL   Lipid panel reflex to direct LDL    Collection Time: 10/14/24  7:59 AM   Result Value Ref Range    Cholesterol 136 <200 mg/dL    Triglycerides 50 <150 mg/dL    Direct Measure HDL 57 >=50 mg/dL    LDL Cholesterol Calculated 69 <100 mg/dL    Non HDL Cholesterol 79 <130 mg/dL            Precautions:     Behavioral Orders   Procedures    Code 1 - Restrict to Unit    [place]    MMPI 2    Routine Programming     As clinically indicated    Self Injury Precaution     Status 15     Every 15 minutes.    Suicide precautions: Suicide Risk: LOW; Clinical rationale to override score: modification to the care environment     Patients on Suicide Precautions should have a Combination Diet ordered that includes a Diet selection(s) AND a Behavioral Tray selection for Safe Tray - with utensils, or Safe Tray - NO utensils       Order Specific Question:   Suicide Risk     Answer:   LOW     Order Specific Question:   Clinical rationale to override score:     Answer:   modification to the care environment            Psychiatric Examination:   Temp: 98  F (36.7  C) Temp src: Temporal BP: 98/60 Pulse: 63     SpO2: 100 %      Weight is 140 lbs 12.8 oz  Body mass index is 21.41 kg/m .    Appearance: awake, alert and adequately groomed  Attitude:  cooperative  Eye Contact:  fair  Mood:  anxious, depressed, and better  Affect:  mood congruent  Speech:  dysarthria  Psychomotor Behavior:  no evidence of tardive dyskinesia, dystonia, or tics  Throught Process:  logical and goal oriented  Associations:  no loose associations  Thought Content:  no evidence of suicidal ideation or homicidal ideation and reports auditory hallucination and a feeling that somebody is behind her at all times.  Insight:  fair  Judgement:  fair  Oriented to:  time, person, and place  Attention Span and Concentration:  intact  Recent and Remote Memory:  intact           Precautions:     Behavioral Orders   Procedures    Code 1 - Restrict to Unit    [place]    MMPI 2    Routine Programming     As clinically indicated    Self Injury Precaution    Status 15     Every 15 minutes.    Suicide precautions: Suicide Risk: LOW; Clinical rationale to override score: modification to the care environment     Patients on Suicide Precautions should have a Combination Diet ordered that includes a Diet selection(s) AND a Behavioral Tray selection for Safe Tray - with utensils, or Safe Tray - NO utensils       Order Specific Question:   Suicide  Risk     Answer:   LOW     Order Specific Question:   Clinical rationale to override score:     Answer:   modification to the care environment          DIagnoses:   Unspecified mood disorder with psychosis versus schizophrenia spectrum disorder  Unspecified anxiety disorder  Autism spectrum disorder, by history  Eating disorder, by history  Trauma history, rule out PTSD  Rule out cluster B traits         Plan:   Medications:  -- Risperidone was discontinued in the emergency room  -- Increase Abilify to 20 mg every morning for psychosis and mood stabilization  -- Gabapentin 100 mg every morning and 400 mg for sleep  -- Trazodone, 100 mg at bedtime.  -- Additional medications include propranolol, Zyprexa, melatonin, hydroxyzine    Medical:  --Internal medicine to follow up for medical problems   --Blood work was reviewed and is within normal limits.  --EKG is WNL    Consults:   --Care was coordinated with the treatment team.   --The patient was consulted on nature of illness and treatment options.      Disposition Plan   Reason for ongoing admission: is unable to care for self due to severe psychosis or humberto  Discharge location: [facility] facility  Discharge Medications: ordered.  Follow-up Appointments: not scheduled  Legal Status: voluntary   Discharge will be granted once symptoms improved.    Referral Status  [facility] ([facility]):   [facility]: Sent 10/15  [facility]: sent 10/15   [facility]: Sent 10/15  [facility]: Sent 10/15    [facility]: [facility]:  [phone]  [facility]: [email or web address]  [facility]: [phone];     Robby Soriano APRN, CNP    This note was created with the help of Dragon dictation system. All grammatical/typing errors or context distortion are unintentional and inherent to software.

## 2024-10-28 NOTE — PLAN OF CARE
Team Note Due:  Monday  Assessment/Intervention/Current Symptoms and Care Coordination  Chart review and met with team, discussed pt progress, symptomology, and response to treatment.  Discussed the discharge plan and any potential impediments to discharge.    CTC received can email response from [facility] stating they could meet with pt in person.     CTC completed acuity rating and progress note.      Discharge Plan or Goal  Pending stabilization & development of a safe discharge plan.    Dispo Plan:[facility] ([facility]):    Medications ordered/sent to:   Provisional Discharge required: NO    Barriers to Discharge   Patient requires further psychiatric stabilization due to current symptomology    Referral Status  [facility] Services ([facility]):   [facility]: Sent 10/15  [facility]: sent 10/15; Follow up week of 10/21; Interview 10/ 29 @ 10am  [facility]: Sent 10/15  [facility]: Sent 10/15; Follow up week of 10/21  Legal Status  Patient is voluntary        Contacts:  MAHNAZ signed 10/12/24 Dulce Menjivar (sister) [phone]     MAHNAZ signed for Yaz Pimentel () [phone]; [email or web address] [facility]:  [phone]  [facility]: [email or web address]  [facility]: [phone];      Upcoming Meetings and Dates/Important Information and next steps:  CTC will respond to [facility] to coordinate meeting.

## 2024-10-28 NOTE — PLAN OF CARE
Problem: Suicide Risk  Goal: Absence of Self-Harm  Outcome: Progressing   Goal Outcome Evaluation:     Plan of Care Reviewed With: patient       Pt med compliant, claims no pain. She denies anxiety, depression, SI, HI, and hallucinations. She ate 100% of breakfast in her room, paced the hallway, read in the hallway, read in her room, and attended morning OT. Pt ate 100% of lunch, talked with a group in the lounge, and attended Process group. After group, pt requested propranolol for anxiety 8/10- given. She did not want hydroxizine first as she says it makes her sleepy.      Pt's Abilify increases from 15 mg to 20 mg daily tomorrow.

## 2024-10-29 PROCEDURE — 90853 GROUP PSYCHOTHERAPY: CPT

## 2024-10-29 PROCEDURE — 250N000013 HC RX MED GY IP 250 OP 250 PS 637: Performed by: REGISTERED NURSE

## 2024-10-29 PROCEDURE — 128N000002 HC R&B CD/MH ADOLESCENT

## 2024-10-29 PROCEDURE — 97150 GROUP THERAPEUTIC PROCEDURES: CPT | Mod: GO

## 2024-10-29 PROCEDURE — 250N000013 HC RX MED GY IP 250 OP 250 PS 637: Performed by: NURSE PRACTITIONER

## 2024-10-29 PROCEDURE — 250N000013 HC RX MED GY IP 250 OP 250 PS 637: Performed by: CLINICAL NURSE SPECIALIST

## 2024-10-29 PROCEDURE — 250N000013 HC RX MED GY IP 250 OP 250 PS 637: Performed by: PSYCHIATRY & NEUROLOGY

## 2024-10-29 RX ORDER — TRAZODONE HYDROCHLORIDE 100 MG/1
100 TABLET ORAL AT BEDTIME
Qty: 30 TABLET | Refills: 0 | Status: SHIPPED | OUTPATIENT
Start: 2024-10-29

## 2024-10-29 RX ORDER — GABAPENTIN 400 MG/1
400 CAPSULE ORAL AT BEDTIME
Qty: 30 CAPSULE | Refills: 0 | Status: SHIPPED | OUTPATIENT
Start: 2024-10-29

## 2024-10-29 RX ORDER — GABAPENTIN 100 MG/1
100 CAPSULE ORAL EVERY MORNING
Qty: 30 CAPSULE | Refills: 0 | Status: SHIPPED | OUTPATIENT
Start: 2024-10-30

## 2024-10-29 RX ORDER — POLYETHYLENE GLYCOL 3350 17 G/17G
17 POWDER, FOR SOLUTION ORAL AT BEDTIME
Qty: 30 PACKET | Refills: 0 | Status: SHIPPED | OUTPATIENT
Start: 2024-10-29

## 2024-10-29 RX ORDER — ARIPIPRAZOLE 20 MG/1
20 TABLET ORAL DAILY
Qty: 30 TABLET | Refills: 0 | Status: SHIPPED | OUTPATIENT
Start: 2024-10-30

## 2024-10-29 RX ORDER — HYDROXYZINE HYDROCHLORIDE 25 MG/1
25 TABLET, FILM COATED ORAL 3 TIMES DAILY PRN
Qty: 90 TABLET | Refills: 0 | Status: SHIPPED | OUTPATIENT
Start: 2024-10-29

## 2024-10-29 RX ORDER — PROPRANOLOL HYDROCHLORIDE 10 MG/1
10 TABLET ORAL 3 TIMES DAILY PRN
Qty: 90 TABLET | Refills: 0 | Status: SHIPPED | OUTPATIENT
Start: 2024-10-29

## 2024-10-29 RX ADMIN — Medication 3 MG: at 20:34

## 2024-10-29 RX ADMIN — PROPRANOLOL HYDROCHLORIDE 10 MG: 10 TABLET ORAL at 14:19

## 2024-10-29 RX ADMIN — GABAPENTIN 400 MG: 300 CAPSULE ORAL at 20:33

## 2024-10-29 RX ADMIN — TRAZODONE HYDROCHLORIDE 100 MG: 100 TABLET ORAL at 20:33

## 2024-10-29 RX ADMIN — ARIPIPRAZOLE 20 MG: 10 TABLET ORAL at 08:35

## 2024-10-29 RX ADMIN — POLYETHYLENE GLYCOL 3350 17 G: 17 POWDER, FOR SOLUTION ORAL at 20:37

## 2024-10-29 RX ADMIN — GABAPENTIN 100 MG: 100 CAPSULE ORAL at 08:35

## 2024-10-29 NOTE — PLAN OF CARE
Problem: Sleep Disturbance  Goal: Adequate Sleep/Rest  Description: Pt will sleep 6-8 hours nightly as evidenced by practicing regular routine of sleep hygiene; inform night staff of not sleeping and timely discuss concerns with provider to identify concerns.  Outcome: Progressing   Goal Outcome Evaluation:    Patient slept 7 during the night. Safety check was done every 15 minutes. No PRN was given. No concern noted.

## 2024-10-29 NOTE — PLAN OF CARE
Problem: Depressive Symptoms  Goal: Depressive Symptoms  Description: Signs and symptoms of listed problems will be absent or manageable.  Outcome: Progressing   Goal Outcome Evaluation:       Pt has been quiet but compliant thru the day. She declines to talk. She denies SI.  ADL's good.  Vitals WDL  Eating & drinking ok.  Will continue to assess.

## 2024-10-29 NOTE — PLAN OF CARE
Team Note Due:  Monday  Assessment/Intervention/Current Symptoms and Care Coordination  Chart review and met with team, discussed pt progress, symptomology, and response to treatment.  Discussed the discharge plan and any potential impediments to discharge.    CTC met with pt to remind them that she will have interview today with [facility]. Pt be seemed to become visibly nervious. This was evidenced by increased playing with fingers and decrease in eye contact. CTC provided encouragement by stating the improvements (increased eye contact, smiling, and being social on the unit). Pt's affect began to brighten and eye contact was increased.     CTC received email from [facility] after pt's interview stating that it went well and that they would like to admit on Thursday. Pt was updated and had a bright smile when hearing this news. Provider was also updated.     [facility] inquired with [facility] about [facility]'s ability to visit pt to help with SSDI application.       CTC completed acuity rating and progress note.      Discharge Plan or Goal  Pending stabilization & development of a safe discharge plan.    Discharge to: [facility]  [address] [address]  [facility]  Transportation: cab    Barriers to Discharge   Patient requires further psychiatric stabilization due to current symptomology    Referral Status  Int[facility]idential Treatment Services (IRTS):   [facility]: Sent 10/15  [facility]: sent 10/15; Follow up week of 10/21; Interview 10/ 29 @ 10am  [facility]: Sent 10/15  [facility]: Sent 10/15; Follow up week of 10/21  Legal Status  Patient is voluntary        Contacts:  MAHNAZ signed 10/12/24 Dulce Menjivar (sister) [phone]     MAHNAZ signed for Yaz Pimentel () [phone]; [email or web address]     [facility]:  [phone]  [facility]:  [email or web address]  [facility]: [phone];      Upcoming Meetings and Dates/Important Information and next steps:  CTC will respond to [facility] to coordinate meeting.

## 2024-10-29 NOTE — PLAN OF CARE
Rehab Group    Start time: 1415  End time: 1505  Patient time total: 45 minutes    attended full group    #5 attended   Group Type: occupational therapy   Group Topic Covered: balanced lifestyle, coping skills, emotional regulation, mindfulness, and relaxation        Group Session Detail:  Coping Skill Exploration     Patient Response/Contribution:  cooperative with task, organized, and actively engaged       Patient Detail:    Coping skill exploration group focused on self-expression/reflection, emotional regulation and identification of alternative perspectives through use of creative writing and poetry. Pt response: Actively engaged in discussion r/t creativity and cognitive flexibility. Pt shared ways they practice creativity. Pt demonstrated understanding of the activity through identifying a theme and using the techniques discussed.         [id number] OT Group (2 or more in attendance)      Patient Active Problem List   Diagnosis    Juvenile idiopathic scoliosis, unspecified spinal region    Raynaud's phenomenon without gangrene    Psychosis (H)    Suicidal ideation    Hallucinations    Psychosis, atypical (H)

## 2024-10-29 NOTE — PLAN OF CARE
Group Attendance:  attended full group     Time session began: 1315  Time session ended: 1415  Patient's total time in group: 60     Total # Attendees    7   Group Type psychotherapeutic, psychoeducational, and CBT      Group Topic Covered healthy coping skills and CBT skills        Group Session Detail Group topic: the Cognitive Model. The relationship between thoughts, emotions and behaviors was discussed. Participants discussed several scenarios and ways to change irrational thoughts into positive and rational thoughts. They also discussed the new emotion and behavior that came from positive, rational thoughts. Cognitive distortions were discussed and patients identified those they struggle with and how to overcome them.       Patient's response to the group topic/interactions:  cooperative with task, listened actively, and attentive      Patient Details: Patient was fairly quiet throughout the group. She reported she wasn't comfortable reading or sharing, but she ws willing to read one cognitive distortion and share which one she tends to practice.               01433 - Group psychotherapy - 1 Session      Patient Active Problem List   Diagnosis    Depression, unspecified    Suicidal ideation    Depression

## 2024-10-29 NOTE — PLAN OF CARE
[facility] IP Unit Acuity Rating Score (UARS)  Patient is given one point for every criteria they meet.    CRITERIA SCORING   On a 72 hour hold, court hold, committed, stay of commitment, or revocation. 0    Patient LOS on [facility] unit exceeds 20 days. 0  LOS: 17   Patient under guardianship, 55+, otherwise medically complex, or under age 11. 0   Suicide ideation without relief of precipitating factors. 1   Current plan for suicide. 0   Current plan for homicide. 0   Imminent risk or actual attempt to seriously harm another without relief of factors precipitating the attempt. 0   Severe dysfunction in daily living (ex: complete neglect for self care, extreme disruption in vegetative function, extreme deterioration in social interactions). 1   Recent (last 7 days) or current physical aggression in the ED or on unit. 0   Restraints or seclusion episode in past 72 hours. 0   Recent (last 7 days) or current verbal aggression, agitation, yelling, etc., while in the ED or unit. 0   Active psychosis. 0   Need for constant or near constant redirection (from leaving, from others, etc).  0   Intrusive or disruptive behaviors. 0   Patient requires 3 or more hours of individualized nursing care per 8-hour shift (i.e. for ADLs, meds, therapeutic interventions). 0   TOTAL 2

## 2024-10-30 LAB — SARS-COV-2 RNA RESP QL NAA+PROBE: NEGATIVE

## 2024-10-30 PROCEDURE — 128N000002 HC R&B CD/MH ADOLESCENT

## 2024-10-30 PROCEDURE — 250N000013 HC RX MED GY IP 250 OP 250 PS 637: Performed by: REGISTERED NURSE

## 2024-10-30 PROCEDURE — 87635 SARS-COV-2 COVID-19 AMP PRB: CPT | Performed by: NURSE PRACTITIONER

## 2024-10-30 PROCEDURE — 90837 PSYTX W PT 60 MINUTES: CPT

## 2024-10-30 PROCEDURE — 97150 GROUP THERAPEUTIC PROCEDURES: CPT | Mod: GO

## 2024-10-30 PROCEDURE — 250N000013 HC RX MED GY IP 250 OP 250 PS 637: Performed by: PSYCHIATRY & NEUROLOGY

## 2024-10-30 PROCEDURE — 250N000013 HC RX MED GY IP 250 OP 250 PS 637: Performed by: NURSE PRACTITIONER

## 2024-10-30 PROCEDURE — 99232 SBSQ HOSP IP/OBS MODERATE 35: CPT | Performed by: NURSE PRACTITIONER

## 2024-10-30 RX ORDER — BENZTROPINE MESYLATE 1 MG/1
1 TABLET ORAL 2 TIMES DAILY
Status: DISCONTINUED | OUTPATIENT
Start: 2024-10-30 | End: 2024-10-31 | Stop reason: HOSPADM

## 2024-10-30 RX ORDER — BENZTROPINE MESYLATE 1 MG/1
1 TABLET ORAL 2 TIMES DAILY
Qty: 60 TABLET | Refills: 0 | Status: SHIPPED | OUTPATIENT
Start: 2024-10-30

## 2024-10-30 RX ADMIN — TRAZODONE HYDROCHLORIDE 100 MG: 100 TABLET ORAL at 20:04

## 2024-10-30 RX ADMIN — GABAPENTIN 400 MG: 300 CAPSULE ORAL at 20:04

## 2024-10-30 RX ADMIN — ARIPIPRAZOLE 20 MG: 10 TABLET ORAL at 08:34

## 2024-10-30 RX ADMIN — GABAPENTIN 100 MG: 100 CAPSULE ORAL at 08:34

## 2024-10-30 RX ADMIN — BENZTROPINE MESYLATE 1 MG: 1 TABLET ORAL at 20:04

## 2024-10-30 RX ADMIN — POLYETHYLENE GLYCOL 3350 17 G: 17 POWDER, FOR SOLUTION ORAL at 20:04

## 2024-10-30 RX ADMIN — BENZTROPINE MESYLATE 1 MG: 1 TABLET ORAL at 13:33

## 2024-10-30 RX ADMIN — Medication 3 MG: at 21:49

## 2024-10-30 ASSESSMENT — ACTIVITIES OF DAILY LIVING (ADL)
ADLS_ACUITY_SCORE: 0
HYGIENE/GROOMING: INDEPENDENT
LAUNDRY: WITH SUPERVISION
ADLS_ACUITY_SCORE: 0
ORAL_HYGIENE: INDEPENDENT
ADLS_ACUITY_SCORE: 0
DRESS: INDEPENDENT
ADLS_ACUITY_SCORE: 0

## 2024-10-30 NOTE — PLAN OF CARE
"Problem: Psychotic Symptoms  Goal: Psychotic Symptoms  Description: Signs and symptoms of listed problems will be absent or manageable.  Outcome: Progressing   Goal Outcome Evaluation:  /66 (BP Location: Left arm, Patient Position: Sitting, Cuff Size: Adult Regular)   Pulse 64   Temp 97.7  F (36.5  C) (Temporal)   Resp 16   Ht 1.727 m (5' 8\")   Wt 63.9 kg (140 lb 14.4 oz)   LMP 08/07/2024   SpO2 99%   BMI 21.42 kg/m      Pt denied any pain. She also denied anxiety, depression,SI/SIB/HI and A/V hallucinations.   She was notably present at the lounge engaged with selected peers. She also paced the hallway, appeared calm, affect was flat and blunted and mood was neutral. Pt's appetite is good. She was medication compliant, denied any medication adverse reactions.   "

## 2024-10-30 NOTE — PLAN OF CARE
Rehab Group    Start time: 1015  End time: 1100  Patient time total: 30 minutes    attended partial group    #6 attended   Group Type: occupational therapy   Group Topic Covered: balanced lifestyle, coping skills, and self-esteem       Group Session Detail:  Mental Health Management: Self-Esteem Promotion     Patient Response/Contribution:  cooperative with task, organized, actively engaged, and engaged socially when prompted       Patient Detail:    Mental Health Management group with a focus on self-esteem and healthy routine building. Pts were instructed through a multi-step project of creating a positive affirmations monthly calendar. Purpose of the activity: Establishing healthy habits and routine, reality orientation, to promote sense of hope, coping and observation of follow through with a multi-step task. Pt Response: Pt arrived to group late. Writer prompted pt to answer the check in question stating their favorite self-esteem building activity/practice. Pt declined stating she doesn't have a good practice, nor was she able to identify something or activity that makes her feel good about herself. Pt engaged in both discussive and task portions of group. Pt able to follow multi-step verbal instruction and remained IND with all task demands.      70006 OT Group (2 or more in attendance)      Patient Active Problem List   Diagnosis    Juvenile idiopathic scoliosis, unspecified spinal region    Raynaud's phenomenon without gangrene    Psychosis (H)    Suicidal ideation    Hallucinations    Psychosis, atypical (H)

## 2024-10-30 NOTE — PLAN OF CARE
"Individual Therapy Note      Date of Service: October 30, 2024    Patient: Sparkle goes by \"Sparkle,\" uses she/her pronouns    Individuals Present: Sparkle [facility]    Session start: 1110  Session end: 1210  Session duration in minutes: 60      Modality Used: CBT, DBT, and Rapport Building    Goals: Develop self compassion and improve self esteem    Patient Description of current symptoms: sad     Mental Status Exam:   Attitude: cooperative  Eye Contact: good  Mood: sad  and depressed  Affect: mood congruent, intensity is blunted, and intensity is flat  Speech: clear, coherent  Psychomotor Behavior: no evidence of tardive dyskinesia, dystonia, or tics  Thought Process:  logical  Associations: no loose associations  Thought Content: no evidence of suicidal ideation or homicidal ideation  Insight: fair  Judgement: fair  Attention Span and Concentration: intact    Pt progress: We discussed patient's work on core beliefs. Patient identified her core belief as \"I am undeserving.\" She identified the evidence for and against this belief and came up with a modified belief. Patient reported a lot of negative feedback from other telling her she is selfish and a liar. She has felt shame and guilt form past conditioning. She reported a time in her life when things were going well and she wasn't self harming or feeling overwhelmed and consumed. This was 1287-7509. In February of 2022 she heard something that made her feel very scared and threatened. She thought she was hearing someone. She started to withdraw and self harm again and having an eating disorder. The voice started at this time. She listened to whatever she was told and believed the voice. She reported that the medication is helping. The voice comes and goes and is less persistent. We did an exercise in self compassion. Patient had difficulty speaking with compassion to herself, but she felt the exercise was helpful.    Treatment Objective(s) Addressed: "   The focus of this session was on identifying and practicing coping strategies, building self-esteem, and developing self compassion and positive affirmations      Progress Towards Goals and Assessment of Patient:   Patient is making progress towards treatment goals as evidenced by discharge tomorrow.       Therapeutic Intervention(s):   Provided active listening, unconditional positive regard, and validation.   Coached on coping techniques/relaxation skills to help improve distress tolerance and managing intense emotions. , Engaged in guided discovery, explored patient's perspectives and helped expand them through socratic dialogue, and self compassion exercise      Plan/next step: no further intervention    25070 - Psychotherapy (with patient) - 60 (53+*) min    Patient Active Problem List   Diagnosis    Juvenile idiopathic scoliosis, unspecified spinal region    Raynaud's phenomenon without gangrene    Psychosis (H)    Suicidal ideation    Hallucinations    Psychosis, atypical (H)

## 2024-10-30 NOTE — PLAN OF CARE
BEH IP Unit Acuity Rating Score (UARS)  Patient is given one point for every criteria they meet.    CRITERIA SCORING   On a 72 hour hold, court hold, committed, stay of commitment, or revocation. 0    Patient LOS on BEH unit exceeds 20 days. 0  LOS: 18   Patient under guardianship, 55+, otherwise medically complex, or under age 11. 0   Suicide ideation without relief of precipitating factors. 1   Current plan for suicide. 0   Current plan for homicide. 0   Imminent risk or actual attempt to seriously harm another without relief of factors precipitating the attempt. 0   Severe dysfunction in daily living (ex: complete neglect for self care, extreme disruption in vegetative function, extreme deterioration in social interactions). 1   Recent (last 7 days) or current physical aggression in the ED or on unit. 0   Restraints or seclusion episode in past 72 hours. 0   Recent (last 7 days) or current verbal aggression, agitation, yelling, etc., while in the ED or unit. 0   Active psychosis. 0   Need for constant or near constant redirection (from leaving, from others, etc).  0   Intrusive or disruptive behaviors. 0   Patient requires 3 or more hours of individualized nursing care per 8-hour shift (i.e. for ADLs, meds, therapeutic interventions). 0   TOTAL 2

## 2024-10-30 NOTE — PLAN OF CARE
The patient was asleep for 6.75hrs during the shift. Patient did not have any behavioral or medical concerns and remain on 15min checks. RN will continue to monitor.

## 2024-10-30 NOTE — PROGRESS NOTES
[facility], [place]   Psychiatric Progress Note        Interim History:   Team meeting report: The patient's care was discussed with the treatment team during the daily team meeting and/or staff's chart notes were reviewed.  Staff reports the patient is calm, pleasant, cooperative.  She had a 2-hour interview with [facility].  She was anxious and was given propranolol.  Did not want to take hydroxyzine because it makes her sleepy.  Denied anxiety depression, suicidal ideation and hallucinations.  No SIB.  In the evening, the patient was visible in the milieu and attending groups.  No behavioral issues.  Eating well.  Attending groups.  Slept through the night.    Met with patient.  Reports that she feels restless.  She feels her body is moving even when she is not anxious.  Discussed the possibility that Abilify might be causing it however, the patient does not want to decrease the dose.  States she had it in the last 2 or 3 days but did not mention it before.  She is agreeable to try Cogentin.  Otherwise she feels good as far as her mental health.  She is adamantly denying suicidal ideation and self-injury behaviors.  Minimal depression.  Anxiety varies depending on the day.  Hallucinations are very minimal almost not existing.  She feels good about discharging.  The patient has no other questions or concerns.    Paperwork for the [facility] place was completed.        Medications:     Current Facility-Administered Medications   Medication Dose Route Frequency Provider Last Rate Last Admin    ARIPiprazole (ABILIFY) tablet 20 mg  20 mg Oral Daily Robby Soriano APRN CNP   20 mg at 10/30/24 0834    benztropine (COGENTIN) tablet 1 mg  1 mg Oral BID Robby Soriano APRN CNP        gabapentin (NEURONTIN) capsule 100 mg  100 mg Oral QAM Robby Soriano APRN CNP   100 mg at 10/30/24 0834    gabapentin (NEURONTIN) capsule 400 mg  400 mg Oral At Bedtime Christie  Robby Borrerosebaschinmay APRN CNP   400 mg at 10/29/24 2033    polyethylene glycol (MIRALAX) Packet 17 g  17 g Oral At Bedtime Sandra Mayers APRN CNP   17 g at 10/29/24 2037    traZODone (DESYREL) tablet 100 mg  100 mg Oral At Bedtime Robby Soriano APRN CNP   100 mg at 10/29/24 2033            Allergies:     Allergies   Allergen Reactions    Cats     Seasonal Allergies             Labs:     Recent Results (from the past 4 weeks)   HCG qualitative urine    Collection Time: 10/11/24  9:22 AM   Result Value Ref Range    hCG Urine Qualitative Negative Negative   Urine Drug Screen Panel    Collection Time: 10/11/24  9:22 AM   Result Value Ref Range    Amphetamines Urine Screen Negative Screen Negative    Barbituates Urine Screen Negative Screen Negative    Benzodiazepine Urine Screen Negative Screen Negative    Cannabinoids Urine Screen Negative Screen Negative    Cocaine Urine Screen Negative Screen Negative    Fentanyl Qual Urine Screen Negative Screen Negative    Opiates Urine Screen Negative Screen Negative    PCP Urine Screen Negative Screen Negative   Asymptomatic COVID-19 Virus (Coronavirus) by PCR Nose    Collection Time: 10/11/24  6:16 PM    Specimen: Nose; Swab   Result Value Ref Range    SARS CoV2 PCR Negative Negative   EKG 12-lead, tracing only    Collection Time: 10/12/24 10:32 AM   Result Value Ref Range    Systolic Blood Pressure  mmHg    Diastolic Blood Pressure  mmHg    Ventricular Rate 57 BPM    Atrial Rate 57 BPM    AL Interval 144 ms    QRS Duration 94 ms     ms    QTc 399 ms    P Axis 32 degrees    R AXIS 79 degrees    T Axis 37 degrees    Interpretation ECG       Sinus bradycardia  Otherwise normal ECG  No previous ECGs available  Confirmed by MD VIVIAN, LANETTE ([id number]) on 10/13/2024 10:23:47 AM     Vitamin D Deficiency    Collection Time: 10/12/24 11:21 AM   Result Value Ref Range    Vitamin D, Total (25-Hydroxy) 27 20 - 50 ng/mL   Hemoglobin A1c    Collection Time: 10/12/24  11:21 AM   Result Value Ref Range    Estimated Average Glucose 80 <117 mg/dL    Hemoglobin A1C 4.4 <5.7 %   Folate    Collection Time: 10/12/24 11:21 AM   Result Value Ref Range    Folic Acid 11.6 4.6 - 34.8 ng/mL   Vitamin B12    Collection Time: 10/12/24 11:21 AM   Result Value Ref Range    Vitamin B12 414 232 - 1,245 pg/mL   Comprehensive metabolic panel    Collection Time: 10/12/24 11:21 AM   Result Value Ref Range    Sodium 139 135 - 145 mmol/L    Potassium 4.1 3.4 - 5.3 mmol/L    Carbon Dioxide (CO2) 23 22 - 29 mmol/L    Anion Gap 11 7 - 15 mmol/L    Urea Nitrogen 8.2 6.0 - 20.0 mg/dL    Creatinine 0.68 0.51 - 0.95 mg/dL    GFR Estimate >90 >60 mL/min/1.73m2    Calcium 9.5 8.8 - 10.4 mg/dL    Chloride 105 98 - 107 mmol/L    Glucose 88 70 - 99 mg/dL    Alkaline Phosphatase 43 40 - 150 U/L    AST 20 0 - 45 U/L    ALT 6 0 - 50 U/L    Protein Total 7.0 6.4 - 8.3 g/dL    Albumin 4.3 3.5 - 5.2 g/dL    Bilirubin Total 0.6 <=1.2 mg/dL   CBC with platelets and differential    Collection Time: 10/12/24 11:21 AM   Result Value Ref Range    WBC Count 6.1 4.0 - 11.0 10e3/uL    RBC Count 4.03 3.80 - 5.20 10e6/uL    Hemoglobin 12.2 11.7 - 15.7 g/dL    Hematocrit 37.2 35.0 - 47.0 %    MCV 92 78 - 100 fL    MCH 30.3 26.5 - 33.0 pg    MCHC 32.8 31.5 - 36.5 g/dL    RDW 12.3 10.0 - 15.0 %    Platelet Count 216 150 - 450 10e3/uL    % Neutrophils 62 %    % Lymphocytes 30 %    % Monocytes 6 %    % Eosinophils 1 %    % Basophils 1 %    % Immature Granulocytes 0 %    NRBCs per 100 WBC 0 <1 /100    Absolute Neutrophils 3.8 1.6 - 8.3 10e3/uL    Absolute Lymphocytes 1.8 0.8 - 5.3 10e3/uL    Absolute Monocytes 0.4 0.0 - 1.3 10e3/uL    Absolute Eosinophils 0.1 0.0 - 0.7 10e3/uL    Absolute Basophils 0.1 0.0 - 0.2 10e3/uL    Absolute Immature Granulocytes 0.0 <=0.4 10e3/uL    Absolute NRBCs 0.0 10e3/uL   Lipid panel reflex to direct LDL    Collection Time: 10/14/24  7:59 AM   Result Value Ref Range    Cholesterol 136 <200 mg/dL     Triglycerides 50 <150 mg/dL    Direct Measure HDL 57 >=50 mg/dL    LDL Cholesterol Calculated 69 <100 mg/dL    Non HDL Cholesterol 79 <130 mg/dL            Precautions:     Behavioral Orders   Procedures    Code 1 - Restrict to Unit    [place]    MMPI 2    Routine Programming     As clinically indicated    Self Injury Precaution    Status 15     Every 15 minutes.    Suicide precautions: Suicide Risk: LOW; Clinical rationale to override score: modification to the care environment     Patients on Suicide Precautions should have a Combination Diet ordered that includes a Diet selection(s) AND a Behavioral Tray selection for Safe Tray - with utensils, or Safe Tray - NO utensils       Order Specific Question:   Suicide Risk     Answer:   LOW     Order Specific Question:   Clinical rationale to override score:     Answer:   modification to the care environment            Psychiatric Examination:   Temp: 97.4  F (36.3  C) Temp src: Temporal BP: 102/70 Pulse: 103     SpO2: 92 % O2 Device: None (Room air)    Weight is 140 lbs 14.4 oz  Body mass index is 21.42 kg/m .    Appearance: awake, alert and adequately groomed  Attitude:  cooperative  Eye Contact:  fair  Mood:  anxious, depressed, and better  Affect:  mood congruent  Speech:  dysarthria  Psychomotor Behavior:  no evidence of tardive dyskinesia, dystonia, or tics  Throught Process:  logical and goal oriented  Associations:  no loose associations  Thought Content:  no evidence of suicidal ideation or homicidal ideation and reports auditory hallucination and a feeling that somebody is behind her at all times.  Insight:  fair  Judgement:  fair  Oriented to:  time, person, and place  Attention Span and Concentration:  intact  Recent and Remote Memory:  intact           Precautions:     Behavioral Orders   Procedures    Code 1 - Restrict to Unit    [place]    MMPI 2    Routine Programming     As clinically indicated    Self Injury Precaution    Status 15     Every 15 minutes.     Suicide precautions: Suicide Risk: LOW; Clinical rationale to override score: modification to the care environment     Patients on Suicide Precautions should have a Combination Diet ordered that includes a Diet selection(s) AND a Behavioral Tray selection for Safe Tray - with utensils, or Safe Tray - NO utensils       Order Specific Question:   Suicide Risk     Answer:   LOW     Order Specific Question:   Clinical rationale to override score:     Answer:   modification to the care environment          DIagnoses:   Unspecified mood disorder with psychosis versus schizophrenia spectrum disorder  Unspecified anxiety disorder  Autism spectrum disorder, by history  Eating disorder, by history  Trauma history, rule out PTSD  Rule out cluster B traits         Plan:   Medications:  -- Risperidone was discontinued in the emergency room  -- Increase Abilify to 20 mg every morning for psychosis and mood stabilization  -- Gabapentin 100 mg every morning and 400 mg for sleep  -- Trazodone, 100 mg at bedtime.  -- Start Cogentin, 1 mg twice a day for akathisia  -- Additional medications include propranolol, Zyprexa, melatonin, hydroxyzine    Medical:  --Internal medicine to follow up for medical problems   --Blood work was reviewed and is within normal limits.  --EKG is WNL    Consults:   --Care was coordinated with the treatment team.   --The patient was consulted on nature of illness and treatment options.      Disposition Plan   Reason for ongoing admission: is unable to care for self due to severe psychosis or humberto  Discharge location: [facility] facility  Discharge Medications: ordered.  Follow-up Appointments: not scheduled  Legal Status: voluntary   Discharge will be granted once symptoms improved.    Referral Status  [facility] ([facility]):   [facility]: Sent 10/15  [facility]: sent 10/15   [facility]: Sent 10/15  [facility]: Sent 10/15    MAHNAZ: [facility]:  [phone]  [facility]  [facility]: [email or web address]  [facility]: [phone];     Robby SANTOS, CNP    This note was created with the help of Dragon dictation system. All grammatical/typing errors or context distortion are unintentional and inherent to software.

## 2024-10-30 NOTE — PLAN OF CARE
"Problem: Psychotic Symptoms  Goal: Psychotic Symptoms  Description: Signs and symptoms of listed problems will be absent or manageable.  Outcome: Progressing   Goal Outcome Evaluation:       Vitals:  /70 (BP Location: Left arm, Patient Position: Sitting, Cuff Size: Adult Regular)   Pulse 103   Temp 97.4  F (36.3  C) (Temporal)   Resp 16   Ht 1.727 m (5' 8\")   Wt 63.9 kg (140 lb 14.4 oz)   LMP 08/07/2024   SpO2 92%   BMI 21.42 kg/m       Pain:  No observed physical pain.    Appetite and sleep:  Pt appetite is well with no concerns. Pt reported having trouble sleeping last night because sound machine was not working; otherwise no other concerns.    Medical:  Pt wants to increase propanolol dose, states it has been helping her anxiety, however she has \"king out\" moments where she feels light headed while up and walking. Pt encouraged to drink fluids and rest. No other side effects reported from medications.    Mental Health:  Pt seen visible in milieu and dining room socializing well with peers. Med compliant, cooperative and calm, flat affect, shows little to no eye contact. Pt responds with \"I'm fine\" when asked about mood, rates depression 3-6/10 and anxiety 3-6/10, 10 being the worst. Pt denies SI/SIB and A/V hallucinations. Pt states disturbing thoughts \"comes and goes\". Pt's  is to visit and assist pt with paperwork; pt requested phone to be charged prior to discharge tomorrow on 10/31. Will continue to monitor and assess.    PRNs Given:  None.                 "

## 2024-10-30 NOTE — PLAN OF CARE
Rehab Group    Start time: 1315  End time: 1400  Patient time total: 45 minutes    attended full group    #7 attended   Group Type: occupational therapy   Group Topic Covered: balanced lifestyle, coping skills, educational support, relationship skills/support systems, and self-care       Group Session Detail:  Life Skills     Patient Response/Contribution:  cooperative with task, socially appropriate, expressed understanding of topic, and engaged socially when prompted       Patient Detail:    General health and coping group with a discussion focused on various mental health topics, including mental health management, social situations, healthy support systems, healthy mind/body, and activities of daily living. Pt Response: Pt responded to prompts honestly. Pt did share that her family is unaware of her mental illness which in turn affects how much of a support system she has currently. Pt did not indicate desire to change this at this time.       [id number] OT Group (2 or more in attendance)      Patient Active Problem List   Diagnosis    Juvenile idiopathic scoliosis, unspecified spinal region    Raynaud's phenomenon without gangrene    Psychosis (H)    Suicidal ideation    Hallucinations    Psychosis, atypical (H)

## 2024-10-30 NOTE — PLAN OF CARE
Team Note Due:  Monday  Assessment/Intervention/Current Symptoms and Care Coordination  Chart review and met with team, discussed pt progress, symptomology, and response to treatment.  Discussed the discharge plan and any potential impediments to discharge.    [facility] and psych provider coordinated with [facility] for pt's discharge.     [facility] sent pre admit paperwork to [facility].     [facility] updated nursing staff and [facility] of pt's discharge tomorrow morning.     [facility] tasked care coordinators with scheduling transportation  .   CTC completed acuity rating.       Discharge Plan or Goal  Pending stabilization & development of a safe discharge plan.    Discharge to: [facility]  Address[address] [place] [address]  Discharge date/time: 10/31 @ 8:45am  Transportation: cab    Barriers to Discharge   Patient requires further psychiatric stabilization due to current symptomology    Referral Status  [facility]idential Treatment Services (IRTS):   [facility]: Sent 10/15  [facility]: sent 10/15; Follow up week of 10/21; Interview 10/ 29 @ 10am  [facility]: Sent 10/15  [facility]: Sent 10/15; Follow up week of 10/21; Accepted   Legal Status  Patient is voluntary        Contacts:  MAHNAZ signed 10/12/24 Dulce Menjivar (sister) [phone]     MAHNAZ signed for Yaz Pimentel () [phone]; [email or web address]     [facility]:  [phone]  [facility]: [email or web address]  [facility]: [phone];      Upcoming Meetings and Dates/Important Information and next steps:

## 2024-10-30 NOTE — PLAN OF CARE
Care Coordinator scheduled the following Med Ride:     Insurance/ Name: [employer] ([phone])/Naima   Transportation Company/ Name: [employer]/Dispatch   Ride Confirmation # [id number]   Transportation Company Phone: [phone]  P/u Date/Time: Thursday, October 31, 2024 @ 8:45am   P/u Address: [place] [id number]  Destination: [address] [place], [place] [id number]   **Cab instructed to call Nursing Station upon arrival: [phone]     CC confirmed that ride is indeed scheduled via [email or web address].     CC updated CTC

## 2024-10-30 NOTE — PLAN OF CARE
"Problem: Psychotic Symptoms  Goal: Psychotic Symptoms  Description: Signs and symptoms of listed problems will be absent or manageable.  Outcome: Progressing   Goal Outcome Evaluation:    /74 (BP Location: Right arm, Patient Position: Sitting, Cuff Size: Adult Regular)   Pulse 61   Temp 98.1  F (36.7  C) (Oral)   Resp 16   Ht 1.727 m (5' 8\")   Wt 63.9 kg (140 lb 14.4 oz)   LMP 08/07/2024   SpO2 100%   BMI 21.42 kg/m      Pt was present at the [facility] engaged with selected peers. She appeared calm, affect was flat and blunted and mood was neutral. Pt denied any pain. Pt denied anxiety, depression,SI/SIB/HI and A/V hallucinations. Pt's appetite is good. She was medication compliant, denied any medication adverse reactions.  "

## 2024-10-31 VITALS
HEIGHT: 68 IN | WEIGHT: 140.9 LBS | BODY MASS INDEX: 21.35 KG/M2 | HEART RATE: 62 BPM | DIASTOLIC BLOOD PRESSURE: 81 MMHG | SYSTOLIC BLOOD PRESSURE: 119 MMHG | RESPIRATION RATE: 16 BRPM | OXYGEN SATURATION: 96 % | TEMPERATURE: 97.1 F

## 2024-10-31 PROCEDURE — 250N000011 HC RX IP 250 OP 636: Performed by: NURSE PRACTITIONER

## 2024-10-31 PROCEDURE — 99238 HOSP IP/OBS DSCHRG MGMT 30/<: CPT | Performed by: NURSE PRACTITIONER

## 2024-10-31 PROCEDURE — G0008 ADMIN INFLUENZA VIRUS VAC: HCPCS | Performed by: NURSE PRACTITIONER

## 2024-10-31 PROCEDURE — 250N000013 HC RX MED GY IP 250 OP 250 PS 637: Performed by: NURSE PRACTITIONER

## 2024-10-31 PROCEDURE — 90656 IIV3 VACC NO PRSV 0.5 ML IM: CPT | Performed by: NURSE PRACTITIONER

## 2024-10-31 PROCEDURE — 250N000013 HC RX MED GY IP 250 OP 250 PS 637: Performed by: PSYCHIATRY & NEUROLOGY

## 2024-10-31 RX ADMIN — ACETAMINOPHEN 650 MG: 325 TABLET, FILM COATED ORAL at 08:11

## 2024-10-31 RX ADMIN — BENZTROPINE MESYLATE 1 MG: 1 TABLET ORAL at 08:15

## 2024-10-31 RX ADMIN — INFLUENZA A VIRUS A/VICTORIA/4897/2022 IVR-238 (H1N1) ANTIGEN (FORMALDEHYDE INACTIVATED), INFLUENZA A VIRUS A/CALIFORNIA/122/2022 SAN-022 (H3N2) ANTIGEN (FORMALDEHYDE INACTIVATED), AND INFLUENZA B VIRUS B/MICHIGAN/01/2021 ANTIGEN (FORMALDEHYDE INACTIVATED) 0.5 ML: 15; 15; 15 INJECTION, SUSPENSION INTRAMUSCULAR at 08:15

## 2024-10-31 RX ADMIN — ARIPIPRAZOLE 20 MG: 10 TABLET ORAL at 08:11

## 2024-10-31 RX ADMIN — GABAPENTIN 100 MG: 100 CAPSULE ORAL at 08:15

## 2024-10-31 ASSESSMENT — ACTIVITIES OF DAILY LIVING (ADL)
ADLS_ACUITY_SCORE: 0
HYGIENE/GROOMING: INDEPENDENT
ADLS_ACUITY_SCORE: 0
ORAL_HYGIENE: INDEPENDENT
DRESS: STREET CLOTHES

## 2024-10-31 NOTE — PLAN OF CARE
BEH IP Unit Acuity Rating Score (UARS)  Patient is given one point for every criteria they meet.    CRITERIA SCORING   On a 72 hour hold, court hold, committed, stay of commitment, or revocation. 0    Patient LOS on BEH unit exceeds 20 days. 0  LOS: 19   Patient under guardianship, 55+, otherwise medically complex, or under age 11. 0   Suicide ideation without relief of precipitating factors. 0   Current plan for suicide. 0   Current plan for homicide. 0   Imminent risk or actual attempt to seriously harm another without relief of factors precipitating the attempt. 0   Severe dysfunction in daily living (ex: complete neglect for self care, extreme disruption in vegetative function, extreme deterioration in social interactions). 0   Recent (last 7 days) or current physical aggression in the ED or on unit. 0   Restraints or seclusion episode in past 72 hours. 0   Recent (last 7 days) or current verbal aggression, agitation, yelling, etc., while in the ED or unit. 0   Active psychosis. 0   Need for constant or near constant redirection (from leaving, from others, etc).  0   Intrusive or disruptive behaviors. 0   Patient requires 3 or more hours of individualized nursing care per 8-hour shift (i.e. for ADLs, meds, therapeutic interventions). 0   TOTAL 0
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
## 2024-10-31 NOTE — PLAN OF CARE
Team Note Due:  Monday  Assessment/Intervention/Current Symptoms and Care Coordination  Chart review and met with team, discussed pt progress, symptomology, and response to treatment.  Discussed the discharge plan and any potential impediments to discharge.    CTC coordinated with unit staff for pt's discharge   .   CTC completed acuity rating.       Discharge Plan or Goal  Pending stabilization & development of a safe discharge plan.    Discharge to: [facility]  Address: [address] [place] [address]  Discharge date/time: 10/31 @ 8:45am  Transportation: cab    Barriers to Discharge   Patient requires further psychiatric stabilization due to current symptomology    Referral Status  Intensive Residential Treatment Services (IRTS):   [facility]: Sent 10/15  [facility]: sent 10/15; Follow up week of 10/21; Interview 10/ 29 @ 10am  [facility]: Sent 10/15  [facility]: Sent 10/15; Follow up week of 10/21; Accepted   Legal Status  Patient is voluntary        Contacts:  MAHNAZ signed 10/12/24 Dulce Tiara (sister) [phone]     MAHNAZ signed for Yaz Pimentel () [phone]; [email or web address] [facility]:  [phone]  [facility]: [email or web address]  [facility]: [phone];      Upcoming Meetings and Dates/Important Information and next steps:

## 2024-10-31 NOTE — PLAN OF CARE
Goal Outcome Evaluation:    Patient slept for 7 hours last night. Breathing noted even and unlabored. Safety precautions in progress with no occurrence. No behavior or safety concerns at this time. Will continue to monitor.

## 2024-10-31 NOTE — PLAN OF CARE
"  Problem: Adult Behavioral Health Plan of Care  Goal: Optimized Coping Skills in Response to Life Stressors  Outcome: Adequate for Care Transition   Goal Outcome Evaluation:       Patient discharging 10/31/2024 accompanied by Med cab and destination is [facility]: [facility] [place] [address] .    Discharge paperwork and medications reviewed with patient who verbalizes understanding. Pt presents with blunt affect groomed appearance, alert and oriented x 4, reports feeling \"little nervous\" pt denies any SI or other disturbing thoughts; med compliant, no observed or reported side effects; pt instructed on importance of maintaining hydration and making slower positional movements as necessary given her medications; pt denies any physical pain or other medical concerns-given tylenol pro phylactically upon request as receives flu vaccine in right deltoid without incident; all questions answered.  Pt leaves unit at 0845 taking all personal items, medications and instructions.    Copies provided: AVS-yes          Illness Management Recovery model: Personal Plan of Care    Patient completed Personal Plan of Care, identifying reasons for hospitalization and goals for discharge. Form reviewed in team meeting  by patient, physician, writer and RN. Form given to [facility] to be scanned into EPIC.    Survey provided.                   "

## 2024-11-18 NOTE — PROGRESS NOTES
"Pt was up for breakfast, ate 85%, took her morning medication with no issue. Pt denied any SI/SIB/hallucinations/depression/anxiety and contracted to safety. Pt represented with a restricted/blunt/flat affect and reported mood as \"good\" with clear coherent, non-pressured speech although delayed response. Will continue with POC.  " Juliocesar pharmacist with [facility]'s pharmacy calling in regards to patient's azithromycin (ZITHROMAX) 100 MG/5ML suspension.     Juliocesar states they need higher dosage for medication sent to pharmacy.     Please advise     Juliocesar can be reached at [phone]

## 2025-03-08 ENCOUNTER — HEALTH MAINTENANCE LETTER (OUTPATIENT)
Age: 25
End: 2025-03-08